# Patient Record
Sex: MALE | Race: WHITE | Employment: UNEMPLOYED | URBAN - METROPOLITAN AREA
[De-identification: names, ages, dates, MRNs, and addresses within clinical notes are randomized per-mention and may not be internally consistent; named-entity substitution may affect disease eponyms.]

---

## 2021-03-16 ENCOUNTER — HOSPITAL ENCOUNTER (EMERGENCY)
Age: 31
Discharge: HOME | End: 2021-03-17
Attending: STUDENT IN AN ORGANIZED HEALTH CARE EDUCATION/TRAINING PROGRAM
Payer: COMMERCIAL

## 2021-03-16 ENCOUNTER — APPOINTMENT (OUTPATIENT)
Dept: GENERAL RADIOLOGY | Age: 31
End: 2021-03-16
Payer: COMMERCIAL

## 2021-03-16 ENCOUNTER — HOSPITAL ENCOUNTER (EMERGENCY)
Age: 31
Discharge: HOME | End: 2021-03-16
Attending: EMERGENCY MEDICINE
Payer: COMMERCIAL

## 2021-03-16 ENCOUNTER — OFFICE VISIT (OUTPATIENT)
Dept: ORTHOPEDIC SURGERY | Age: 31
End: 2021-03-16
Payer: COMMERCIAL

## 2021-03-16 VITALS
TEMPERATURE: 97.9 F | DIASTOLIC BLOOD PRESSURE: 96 MMHG | OXYGEN SATURATION: 97 % | SYSTOLIC BLOOD PRESSURE: 154 MMHG | HEART RATE: 69 BPM | WEIGHT: 230 LBS | BODY MASS INDEX: 28.6 KG/M2 | RESPIRATION RATE: 16 BRPM | HEIGHT: 75 IN

## 2021-03-16 DIAGNOSIS — R22.31 LOCALIZED SWELLING ON RIGHT HAND: ICD-10-CM

## 2021-03-16 DIAGNOSIS — M79.89 SWELLING OF RIGHT HAND: Primary | ICD-10-CM

## 2021-03-16 DIAGNOSIS — Z76.0 ENCOUNTER FOR MEDICATION REFILL: ICD-10-CM

## 2021-03-16 DIAGNOSIS — M79.641 RIGHT HAND PAIN: Primary | ICD-10-CM

## 2021-03-16 DIAGNOSIS — L03.113 CELLULITIS OF RIGHT HAND: Primary | ICD-10-CM

## 2021-03-16 DIAGNOSIS — F19.11 HISTORY OF DRUG ABUSE (HCC): ICD-10-CM

## 2021-03-16 PROCEDURE — G8427 DOCREV CUR MEDS BY ELIG CLIN: HCPCS | Performed by: PHYSICIAN ASSISTANT

## 2021-03-16 PROCEDURE — 99284 EMERGENCY DEPT VISIT MOD MDM: CPT

## 2021-03-16 PROCEDURE — 96374 THER/PROPH/DIAG INJ IV PUSH: CPT

## 2021-03-16 PROCEDURE — 4004F PT TOBACCO SCREEN RCVD TLK: CPT | Performed by: PHYSICIAN ASSISTANT

## 2021-03-16 PROCEDURE — 99204 OFFICE O/P NEW MOD 45 MIN: CPT | Performed by: PHYSICIAN ASSISTANT

## 2021-03-16 PROCEDURE — G8484 FLU IMMUNIZE NO ADMIN: HCPCS | Performed by: PHYSICIAN ASSISTANT

## 2021-03-16 PROCEDURE — G8419 CALC BMI OUT NRM PARAM NOF/U: HCPCS | Performed by: PHYSICIAN ASSISTANT

## 2021-03-16 PROCEDURE — 73130 X-RAY EXAM OF HAND: CPT

## 2021-03-16 RX ORDER — LEVETIRACETAM 500 MG/1
750 TABLET ORAL 2 TIMES DAILY
Qty: 30 TABLET | Refills: 0 | Status: SHIPPED | OUTPATIENT
Start: 2021-03-16 | End: 2021-03-26

## 2021-03-16 RX ORDER — NAPROXEN 500 MG/1
500 TABLET ORAL 2 TIMES DAILY
Qty: 30 TABLET | Refills: 0 | Status: SHIPPED | OUTPATIENT
Start: 2021-03-16 | End: 2021-10-20

## 2021-03-16 RX ORDER — GABAPENTIN 800 MG/1
800 TABLET ORAL 4 TIMES DAILY
COMMUNITY

## 2021-03-16 RX ORDER — LEVETIRACETAM 500 MG/1
750 TABLET ORAL 2 TIMES DAILY
COMMUNITY
End: 2021-03-16 | Stop reason: SDUPTHER

## 2021-03-16 RX ORDER — METHADONE HYDROCHLORIDE 10 MG/1
130 TABLET ORAL DAILY
COMMUNITY

## 2021-03-16 ASSESSMENT — PAIN DESCRIPTION - LOCATION
LOCATION: HAND;ARM
LOCATION: HAND

## 2021-03-16 ASSESSMENT — PAIN SCALES - GENERAL: PAINLEVEL_OUTOF10: 10

## 2021-03-16 NOTE — ED NOTES
Placed pt's right hand in Velcro Thumb Spica. Pt tolerated application and acknowledged care taking instructions.      Ottoniel Parker  03/16/21 1131

## 2021-03-16 NOTE — ED NOTES
Verbal and written discharge instructions given. Prescriptions called in to patient's preferred pharmacy. Patient ambulatory at time of discharge. Patient in stable condition, discharged home.      Svetlana Quiñonez RN  03/16/21 8617

## 2021-03-16 NOTE — DISCHARGE INSTR - COC
Continuity of Care Form    Patient Name: Boone Castro   :  1990  MRN:  [id number]    Admit date:  3/16/2021  Discharge date:  ***    Code Status Order: No Order   Advance Directives:     Admitting Physician:  No admitting provider for patient encounter. PCP: No primary care provider on file. Discharging Nurse: [facility] Unit/Room#:   Discharging Unit Phone Number: ***    Emergency Contact:   Extended Emergency Contact Information  Primary Emergency Contact: Jose Castro  Address: [address], [place] [address]  Home Phone: [phone]  Relation: Parent  Secondary Emergency Contact: Rimma Simeon III  Address: [address], [place] [address]  Home Phone: [phone]  Relation: Parent    Past Surgical History:  History reviewed. No pertinent surgical history. Immunization History: There is no immunization history on file for this patient. Active Problems: There is no problem list on file for this patient.       Isolation/Infection:   Isolation          No Isolation        Patient Infection Status     None to display          Nurse Assessment:  Last Vital Signs: BP (!) 155/99   Pulse 79   Temp 97.9 °F (36.6 °C) (Oral)   Resp 16   Ht 6' 3\" (1.905 m)   Wt 230 lb (104.3 kg)   SpO2 96%   BMI 28.75 kg/m²     Last documented pain score (0-10 scale): Pain Level: 7  Last Weight:   Wt Readings from Last 1 Encounters:   21 230 lb (104.3 kg)     Mental Status:  {IP PT MENTAL STATUS:15927}    IV Access:  { CHANEL IV ACCESS:483862758}    Nursing Mobility/ADLs:  Walking   {CHP DME VYYF:143292794}  Transfer  {CHP DME LQHV:221533762}  Bathing  {CHP DME MBQP:636503094}  Dressing  {CHP DME PPPF:476774534}  Toileting  {CHP DME LMLF:234790230}  Feeding  {CHP DME DQVB:646556116}  Med Admin  {CHP DME VBUY:299117302}  Med Delivery   { CHANEL MED Delivery:606318043}    Wound Care Documentation and Therapy:        Elimination:  Continence: · Bowel: {YES / TL:37228}  · Bladder: {YES / LB:02512}  Urinary Catheter: {Urinary Catheter:278597904}   Colostomy/Ileostomy/Ileal Conduit: {YES / HD:55606}       Date of Last BM: ***  No intake or output data in the 24 hours ending 21 1108  No intake/output data recorded.     Safety Concerns:     [address] [facility] Safety Concerns:845072979}    Impairments/Disabilities:      [facility] Impairments/Disabilities:753616539}    Nutrition Therapy:  Current Nutrition Therapy:   [facility] Diet List:652827633}    Routes of Feeding: {CHP DME Other Feedings:916125140}  Liquids: {Slp liquid thickness:25319}  Daily Fluid Restriction: {CHP DME Yes amt example:866797573}  Last Modified Barium Swallow with Video (Video Swallowing Test): {Done Not Done RHLT:645070397}    Treatments at the Time of Hospital Discharge:   Respiratory Treatments: ***  Oxygen Therapy:  {Therapy; copd oxygen:08436}  Ventilator:    { CC Vent DBXP:972891236}    Rehab Therapies: {THERAPEUTIC INTERVENTION:5347615364}  Weight Bearing Status/Restrictions: [address] [place] [address] Weight Bearin}  Other Medical Equipment (for information only, NOT a DME order):  {EQUIPMENT:830924202}  Other Treatments: ***    Patient's personal belongings (please select all that are sent with patient):  {P DME Belongings:790370637}    RN SIGNATURE:  {Esignature:798916243}    CASE MANAGEMENT/SOCIAL WORK SECTION    Inpatient Status Date: ***    Readmission Risk Assessment Score:  Readmission Risk              Risk of Unplanned Readmission:        0           Discharging to Facility/ Agency   · Name:   · Address:  · Phone:  · Fax:    Dialysis Facility (if applicable)   · Name:  · Address:  · Dialysis Schedule:  · Phone:  · Fax:    / signature: {Esignature:180259854}    PHYSICIAN SECTION    Prognosis: {Prognosis:0878234001}    Condition at Discharge: [address] Patient Condition:207521221}    Rehab Potential (if transferring to Rehab): {Prognosis:0709721486}    Recommended Labs or Other Treatments After Discharge: ***    Physician Certification: I certify the above information and transfer of Nuria Castro  is necessary for the continuing treatment of the diagnosis listed and that he requires {Admit to Appropriate Level of Care:74388} for {GREATER/LESS:825769199} 30 days.      Update Admission H&P: {CHP DME Changes in XXN:219384609}    PHYSICIAN SIGNATURE:  {Esignature:439036405}

## 2021-03-16 NOTE — ED PROVIDER NOTES
[facility] Emergency Department    CHIEF COMPLAINT  Hand Problem (right hand swollen and hurting, states he \"smashed it\" at work yesterday between dry wall and a door jam, pain extends up into the back of his arm )      SHARED SERVICE VISIT  Evaluated by NICOLASA. My supervising physician was available for consultation. HISTORY OF PRESENT ILLNESS  Arti Nieto is a 32 y.o. male who presents to the ED complaining of a 1 day history of right hand pain. Patient drove himself in for evaluation. Patient states that he developed some right hand pain and swelling after smashing his hand at work yesterday. Reports pain worse with touch and movement. Does not radiate. Mild tingling in fingers. He is right hand dominant. denies fevers chills. According to chart review is on methadone although denies history of IV drug abuse. Also reports being new to the area. States he was on Keppra and gabapentin for history of seizure disorder. Currently does not have a primary care physician here and requesting referral as well as refill medications. No other complaints, modifying factors or associated symptoms. Nursing notes reviewed. Past Medical History:   Diagnosis Date    Seizures ([address]      History reviewed. No pertinent surgical history. History reviewed. No pertinent family history.   Social History     Socioeconomic History    Marital status: Single     Spouse name: Not on file    Number of children: Not on file    Years of education: Not on file    Highest education level: Not on file   Occupational History    Not on file   Social Needs    Financial resource strain: Not on file    Food insecurity     Worry: Not on file     Inability: Not on file    Transportation needs     Medical: Not on file     Non-medical: Not on file   Tobacco Use    Smoking status: Current Every Day Smoker     Packs/day: 1.00    Smokeless tobacco: Never Used   Substance and Sexual Activity  Alcohol use: No    Drug use: Not Currently     Comment: hx heroin    Sexual activity: Yes     Partners: Female   Lifestyle    Physical activity     Days per week: Not on file     Minutes per session: Not on file    Stress: Not on file   Relationships    Social connections     Talks on phone: Not on file     Gets together: Not on file     Attends Church service: Not on file     Active member of club or organization: Not on file     Attends meetings of clubs or organizations: Not on file     Relationship status: Not on file    Intimate partner violence     Fear of current or ex partner: Not on file     Emotionally abused: Not on file     Physically abused: Not on file     Forced sexual activity: Not on file   Other Topics Concern    Not on file   Social History Narrative    Not on file     No current facility-administered medications for this encounter. Current Outpatient Medications   Medication Sig Dispense Refill    methadone (DOLOPHINE) 10 MG tablet Take 130 mg by mouth daily.  levETIRAcetam (KEPPRA) 500 MG tablet Take 1.5 tablets by mouth 2 times daily for 10 days 30 tablet 0    naproxen (NAPROSYN) 500 MG tablet Take 1 tablet by mouth 2 times daily 30 tablet 0    albuterol sulfate HFA (PROVENTIL HFA) 108 (90 BASE) MCG/ACT inhaler Inhale 2 puffs into the lungs every 4 hours as needed for Wheezing 1 Inhaler 0    gabapentin (NEURONTIN) 800 MG tablet Take 800 mg by mouth 4 times daily. No Known Allergies    REVIEW OF SYSTEMS  10 systems reviewed, pertinent positives per HPI otherwise noted to be negative    PHYSICAL EXAM  BP (!) 155/99   Pulse 79   Temp 97.9 °F (36.6 °C) (Oral)   Resp 16   Ht 6' 3\" (1.905 m)   Wt 230 lb (104.3 kg)   SpO2 96%   BMI 28.75 kg/m²   GENERAL APPEARANCE: Awake and alert. Cooperative. No acute distress. HEAD: Normocephalic. Atraumatic. EYES: PERRL. EOM's grossly intact. ENT: Mucous membranes are moist.   NECK: Supple. HEART: RRR.  No murmurs. LUNGS: Respirations unlabored. CTAB. Good air exchange. Speaking comfortably in full sentences. ABDOMEN: Soft. Non-distended. Non-tender. No guarding or rebound. No masses. No organomegaly. EXTREMITIES: No peripheral edema. Moves all extremities equally. All extremities neurovascularly intact. No obvious puncture wounds to right upper extremity suggest current IV drug abuse. The right hand is swollen without erythema. Pain along ulnar aspect of hand and wrist.  Mild snuffbox tenderness. No obvious deformities or step-offs and compartment soft without crepitus. No evidence of dislocation subluxation. Normal range of motion and strength testing although does elicit pain. Radial pulses +2 and cap refill less than 5 seconds. SKIN: Warm and dry. No acute rashes. NEUROLOGICAL: Alert and oriented. CN's 2-12 intact. No gross facial drooping. Strength 5/5, sensation intact. PSYCHIATRIC: Normal mood and affect. RADIOLOGY  Xr Hand Right (min 3 Views)    Result Date: 3/16/2021  No evidence of acute fracture. Follow-up examination recommended in 7-10 days if clinically indicated. ED COURSE  Pain control was not required while here in the emergency department. Triage vitals stable. Right hand plain films without evidence to suggest acute injury. Given snuffbox tenderness and swelling placed in thumb spica OCL splint. Placed by nursing staff and patient neurovascular intact status post application. Discharged with recommendations for ice and elevation as well as close orthopedic follow-up for repeat imaging should it be indicated. We have also discussed return precautions and recommendations for follow-up. He was provided with PCP referral.  Will refill Keppra. A discussion was had with Mr. Checo Robins regarding hand pain, ED findings and recommendations for follow-up. Risk management discussed and shared decision making had with patient and/or surrogate. All questions were answered.

## 2021-03-16 NOTE — PROGRESS NOTES
Name Primary?  Right hand pain Yes    Localized swelling on right hand     History of drug abuse ([address]        Office Procedures:  Orders Placed This Encounter   Procedures    XR HAND RIGHT (MIN 3 VIEWS)     Standing Status:   Future     Number of Occurrences:   1     Standing Expiration Date:   4/16/2021    SEDIMENTATION RATE     Standing Status:   Future     Standing Expiration Date:   3/16/2022    C-REACTIVE PROTEIN     Standing Status:   Future     Standing Expiration Date:   3/16/2022    CBC WITH AUTO DIFFERENTIAL     Standing Status:   Future     Standing Expiration Date:   3/16/2022       Treatment Plan: After reviewing the patient's emergency room note again there are too many contraindications between the 2 stories. First in the ER he stated he had significant trauma and it was significantly downplayed in today's visit. In addition upon first questioning him he denied any previous treatment of the hand. Also in his chart it does state that he has a history of IV drug use but he denied this history in the emergency room. I will send the patient to the hospital for stat acute phase reactants to look for signs of infection. I would like him to see Dr. Olive Castaneda tomorrow to ensure that the patient does not have a developing cellulitis.

## 2021-03-17 ENCOUNTER — APPOINTMENT (OUTPATIENT)
Dept: CT IMAGING | Age: 31
End: 2021-03-17
Payer: COMMERCIAL

## 2021-03-17 ENCOUNTER — OFFICE VISIT (OUTPATIENT)
Dept: ORTHOPEDIC SURGERY | Age: 31
End: 2021-03-17
Payer: COMMERCIAL

## 2021-03-17 VITALS
DIASTOLIC BLOOD PRESSURE: 83 MMHG | WEIGHT: 315 LBS | HEIGHT: 76 IN | HEART RATE: 76 BPM | BODY MASS INDEX: 38.36 KG/M2 | OXYGEN SATURATION: 100 % | RESPIRATION RATE: 18 BRPM | SYSTOLIC BLOOD PRESSURE: 151 MMHG | TEMPERATURE: 97.2 F

## 2021-03-17 VITALS — WEIGHT: 315 LBS | BODY MASS INDEX: 38.36 KG/M2 | HEIGHT: 76 IN

## 2021-03-17 DIAGNOSIS — L03.113 CELLULITIS OF RIGHT HAND: Primary | ICD-10-CM

## 2021-03-17 LAB
A/G RATIO: 1.5 (ref 1.1–2.2)
ALBUMIN SERPL-MCNC: 4.7 G/DL (ref 3.4–5)
ALP BLD-CCNC: 101 U/L (ref 40–129)
ALT SERPL-CCNC: 45 U/L (ref 10–40)
ANION GAP SERPL CALCULATED.3IONS-SCNC: 10 MMOL/L (ref 3–16)
AST SERPL-CCNC: 30 U/L (ref 15–37)
BASOPHILS ABSOLUTE: 0.1 K/UL (ref 0–0.2)
BASOPHILS RELATIVE PERCENT: 1.6 %
BILIRUB SERPL-MCNC: <0.2 MG/DL (ref 0–1)
BUN BLDV-MCNC: 13 MG/DL (ref 7–20)
C-REACTIVE PROTEIN: 8 MG/L (ref 0–5.1)
CALCIUM SERPL-MCNC: 9.7 MG/DL (ref 8.3–10.6)
CHLORIDE BLD-SCNC: 100 MMOL/L (ref 99–110)
CO2: 30 MMOL/L (ref 21–32)
CREAT SERPL-MCNC: 0.8 MG/DL (ref 0.9–1.3)
EOSINOPHILS ABSOLUTE: 0.4 K/UL (ref 0–0.6)
EOSINOPHILS RELATIVE PERCENT: 5.2 %
GFR AFRICAN AMERICAN: >60
GFR NON-AFRICAN AMERICAN: >60
GLOBULIN: 3.2 G/DL
GLUCOSE BLD-MCNC: 84 MG/DL (ref 70–99)
HCT VFR BLD CALC: 39.6 % (ref 40.5–52.5)
HEMOGLOBIN: 13.3 G/DL (ref 13.5–17.5)
LYMPHOCYTES ABSOLUTE: 2.2 K/UL (ref 1–5.1)
LYMPHOCYTES RELATIVE PERCENT: 29.3 %
MCH RBC QN AUTO: 30 PG (ref 26–34)
MCHC RBC AUTO-ENTMCNC: 33.6 G/DL (ref 31–36)
MCV RBC AUTO: 89.1 FL (ref 80–100)
MONOCYTES ABSOLUTE: 0.5 K/UL (ref 0–1.3)
MONOCYTES RELATIVE PERCENT: 6.8 %
NEUTROPHILS ABSOLUTE: 4.2 K/UL (ref 1.7–7.7)
NEUTROPHILS RELATIVE PERCENT: 57.1 %
PDW BLD-RTO: 15.4 % (ref 12.4–15.4)
PLATELET # BLD: 203 K/UL (ref 135–450)
PMV BLD AUTO: 7 FL (ref 5–10.5)
POTASSIUM REFLEX MAGNESIUM: 4 MMOL/L (ref 3.5–5.1)
RBC # BLD: 4.45 M/UL (ref 4.2–5.9)
SEDIMENTATION RATE, ERYTHROCYTE: 8 MM/HR (ref 0–15)
SODIUM BLD-SCNC: 140 MMOL/L (ref 136–145)
TOTAL PROTEIN: 7.9 G/DL (ref 6.4–8.2)
WBC # BLD: 7.4 K/UL (ref 4–11)

## 2021-03-17 PROCEDURE — 6360000004 HC RX CONTRAST MEDICATION: Performed by: STUDENT IN AN ORGANIZED HEALTH CARE EDUCATION/TRAINING PROGRAM

## 2021-03-17 PROCEDURE — 86140 C-REACTIVE PROTEIN: CPT

## 2021-03-17 PROCEDURE — 85652 RBC SED RATE AUTOMATED: CPT

## 2021-03-17 PROCEDURE — 6360000002 HC RX W HCPCS: Performed by: STUDENT IN AN ORGANIZED HEALTH CARE EDUCATION/TRAINING PROGRAM

## 2021-03-17 PROCEDURE — 73201 CT UPPER EXTREMITY W/DYE: CPT

## 2021-03-17 PROCEDURE — 80053 COMPREHEN METABOLIC PANEL: CPT

## 2021-03-17 PROCEDURE — 99243 OFF/OP CNSLTJ NEW/EST LOW 30: CPT | Performed by: ORTHOPAEDIC SURGERY

## 2021-03-17 PROCEDURE — 6370000000 HC RX 637 (ALT 250 FOR IP): Performed by: STUDENT IN AN ORGANIZED HEALTH CARE EDUCATION/TRAINING PROGRAM

## 2021-03-17 PROCEDURE — 85025 COMPLETE CBC W/AUTO DIFF WBC: CPT

## 2021-03-17 RX ORDER — CEPHALEXIN 500 MG/1
500 CAPSULE ORAL 4 TIMES DAILY
Qty: 28 CAPSULE | Refills: 0 | Status: SHIPPED | OUTPATIENT
Start: 2021-03-17 | End: 2021-03-24

## 2021-03-17 RX ORDER — KETOROLAC TROMETHAMINE 30 MG/ML
15 INJECTION, SOLUTION INTRAMUSCULAR; INTRAVENOUS ONCE
Status: COMPLETED | OUTPATIENT
Start: 2021-03-17 | End: 2021-03-17

## 2021-03-17 RX ORDER — CEPHALEXIN 500 MG/1
500 CAPSULE ORAL ONCE
Status: COMPLETED | OUTPATIENT
Start: 2021-03-17 | End: 2021-03-17

## 2021-03-17 RX ORDER — ACETAMINOPHEN 500 MG
1000 TABLET ORAL ONCE
Status: COMPLETED | OUTPATIENT
Start: 2021-03-17 | End: 2021-03-17

## 2021-03-17 RX ADMIN — ACETAMINOPHEN 1000 MG: 500 TABLET ORAL at 02:18

## 2021-03-17 RX ADMIN — CEPHALEXIN 500 MG: 500 CAPSULE ORAL at 02:18

## 2021-03-17 RX ADMIN — KETOROLAC TROMETHAMINE 15 MG: 30 INJECTION, SOLUTION INTRAMUSCULAR; INTRAVENOUS at 02:18

## 2021-03-17 RX ADMIN — IOPAMIDOL 75 ML: 755 INJECTION, SOLUTION INTRAVENOUS at 00:52

## 2021-03-17 ASSESSMENT — PAIN SCALES - GENERAL: PAINLEVEL_OUTOF10: 10

## 2021-03-17 NOTE — ED TRIAGE NOTES
Chief Complaint   Patient presents with    Hand Pain     Pt states that he started with swollen right hand two days ago. Denies injury.

## 2021-03-17 NOTE — ED PROVIDER NOTES
[address] ED      CHIEF COMPLAINT  Hand Pain (Pt states that he started with swollen right hand two days ago. Denies injury. )     HISTORY OF PRESENT ILLNESS  Alejandro Moreno is a 32 y.o. male  who presents to the ED complaining of right hand pain and swelling for the past 2 days. Patient denies any known injury, however per chart review he states that he had injured at work few days ago. He was actually seen at [facility] earlier in the day, where he had x-ray of the right hand performed which did not reveal any abnormalities. He was advised to follow-up with orthopedics, saw session assistant Ekta Sheth in the [facility] after-hours orthopedic clinic. He does have a history of IV drug abuse in the past but denies anything recently. He was advised to go to the ER by orthopedics to obtain inflammatory markers, has a follow-up appointment with Dr. Jennifer Liz in orthopedics tomorrow. Denies any fevers or chills. He denies any other complaints or concerns. No other complaints, modifying factors or associated symptoms. I have reviewed the following from the nursing documentation. Past Medical History:   Diagnosis Date    Seizures ([facility] [address]      History reviewed. No pertinent surgical history. History reviewed. No pertinent family history.   Social History     Socioeconomic History    Marital status: Single     Spouse name: Not on file    Number of children: Not on file    Years of education: Not on file    Highest education level: Not on file   Occupational History    Not on file   Social Needs    Financial resource strain: Not on file    Food insecurity     Worry: Not on file     Inability: Not on file    Transportation needs     Medical: Not on file     Non-medical: Not on file   Tobacco Use    Smoking status: Current Every Day Smoker     Packs/day: 1.00    Smokeless tobacco: Never Used   Substance and Sexual Activity    Alcohol use: No    Drug use: Not Currently     Comment: hx heroin    Sexual activity: Yes     Partners: Female   Lifestyle    Physical activity     Days per week: Not on file     Minutes per session: Not on file    Stress: Not on file   Relationships    Social connections     Talks on phone: Not on file     Gets together: Not on file     Attends Confucianist service: Not on file     Active member of club or organization: Not on file     Attends meetings of clubs or organizations: Not on file     Relationship status: Not on file    Intimate partner violence     Fear of current or ex partner: Not on file     Emotionally abused: Not on file     Physically abused: Not on file     Forced sexual activity: Not on file   Other Topics Concern    Not on file   Social History Narrative    Not on file     No current facility-administered medications for this encounter. Current Outpatient Medications   Medication Sig Dispense Refill    cephALEXin (KEFLEX) 500 MG capsule Take 1 capsule by mouth 4 times daily for 7 days 28 capsule 0    methadone (DOLOPHINE) 10 MG tablet Take 130 mg by mouth daily.  gabapentin (NEURONTIN) 800 MG tablet Take 800 mg by mouth 4 times daily.  levETIRAcetam (KEPPRA) 500 MG tablet Take 1.5 tablets by mouth 2 times daily for 10 days 30 tablet 0    naproxen (NAPROSYN) 500 MG tablet Take 1 tablet by mouth 2 times daily 30 tablet 0    albuterol sulfate HFA (PROVENTIL HFA) 108 (90 BASE) MCG/ACT inhaler Inhale 2 puffs into the lungs every 4 hours as needed for Wheezing 1 Inhaler 0     No Known Allergies    REVIEW OF SYSTEMS  10 systems reviewed, pertinent positives per HPI otherwise noted to be negative. PHYSICAL EXAM  BP (!) 151/83   Pulse 76   Temp 97.2 °F (36.2 °C) (Oral)   Resp 18   Ht 6' 4\" (1.93 m)   Wt (!) 335 lb (152 kg)   SpO2 100%   BMI 40.78 kg/m²    GENERAL APPEARANCE: Awake and alert. Cooperative. No acute distress. HENT: Normocephalic. Atraumatic. NECK: Supple. EYES: PERRL. EOM's grossly intact. HEART/CHEST: RRR. No murmurs. LUNGS: Respirations unlabored. CTAB. Good air exchange. Speaking comfortably in full sentences. ABDOMEN: No tenderness. Soft. Non-distended. No masses. No organomegaly. No guarding or rebound. MUSCULOSKELETAL: Tenderness palpation over the dorsal and volar aspects of the right hand, it is neurovascularly intact with soft compartments. There is pain primarily over the thenar eminence, no crepitus or obvious deformities. He has no specific tenderness palpation over the flexor tendons. He is able to form a fist and open his hand completely however it is painful to do so. No deformity. .  All extremities neurovascularly intact. SKIN: Warm and dry. No acute rashes. NEUROLOGICAL: Alert and oriented. CN's 2-12 intact. No gross facial drooping. Strength 5/5, sensation intact. Gait normal.  PSYCHIATRIC: Normal mood and affect. LABS  I have reviewed all labs for this visit.    Results for orders placed or performed during the hospital encounter of 03/16/21   CBC Auto Differential   Result Value Ref Range    WBC 7.4 4.0 - 11.0 K/uL    RBC 4.45 4.20 - 5.90 M/uL    Hemoglobin 13.3 (L) 13.5 - 17.5 g/dL    Hematocrit 39.6 (L) 40.5 - 52.5 %    MCV 89.1 80.0 - 100.0 fL    MCH 30.0 26.0 - 34.0 pg    MCHC 33.6 31.0 - 36.0 g/dL    RDW 15.4 12.4 - 15.4 %    Platelets 295 090 - 773 K/uL    MPV 7.0 5.0 - 10.5 fL    Neutrophils % 57.1 %    Lymphocytes % 29.3 %    Monocytes % 6.8 %    Eosinophils % 5.2 %    Basophils % 1.6 %    Neutrophils Absolute 4.2 1.7 - 7.7 K/uL    Lymphocytes Absolute 2.2 1.0 - 5.1 K/uL    Monocytes Absolute 0.5 0.0 - 1.3 K/uL    Eosinophils Absolute 0.4 0.0 - 0.6 K/uL    Basophils Absolute 0.1 0.0 - 0.2 K/uL   Comprehensive Metabolic Panel w/ Reflex to MG   Result Value Ref Range    Sodium 140 136 - 145 mmol/L    Potassium reflex Magnesium 4.0 3.5 - 5.1 mmol/L    Chloride 100 99 - 110 mmol/L    CO2 30 21 - 32 mmol/L    Anion Gap 10 3 - 16    Glucose 84 70 - 99 mg/dL    BUN 13 7 - 20 mg/dL CREATININE 0.8 (L) 0.9 - 1.3 mg/dL    GFR Non-African American >60 >60    GFR African American >60 >60    Calcium 9.7 8.3 - 10.6 mg/dL    Total Protein 7.9 6.4 - 8.2 g/dL    Albumin 4.7 3.4 - 5.0 g/dL    Albumin/Globulin Ratio 1.5 1.1 - 2.2    Total Bilirubin <0.2 0.0 - 1.0 mg/dL    Alkaline Phosphatase 101 40 - 129 U/L    ALT 45 (H) 10 - 40 U/L    AST 30 15 - 37 U/L    Globulin 3.2 g/dL   Sedimentation Rate   Result Value Ref Range    Sed Rate 8 0 - 15 mm/Hr     RADIOLOGY  CT HAND RIGHT W CONTRAST   Final Result   Soft tissue swelling without evidence for abscess or osteomyelitis. ED COURSE/MDM  Patient seen and evaluated. Old records reviewed. Labs and imaging reviewed and results discussed with patient. Patient is a 40-year-old male, sent by orthopedic clinic with concerns for right hand pain and swelling for the past 2 days. Full HPI as detailed above. Upon arrival in the ED, vitals reassuring. Physical exam as detailed above, patient has diffuse swelling of the right hand, no specific areas of erythema no lacerations. He has tenderness palpation over the dorsal aspect of the hand as well as the palmar aspect, it is most prominent over the thenar eminence. Range of motion of the hand is intact. He is afebrile and appears nontoxic. However, given the degree of swelling, labs were obtained as well as CT of the right hand which showed soft tissue swelling without evidence for abscess or osteomyelitis. He has no specific tenderness palpation over the flexor tendons and I am not concerned at this time for flexor tenosynovitis. Labs revealed no leukocytosis. No acute concerning electrolyte abnormalities. Sed rate was normal at 8. CRP was sent as well but is pending. Patient has a follow-up appoint with Dr. Art Snow tomorrow. He will be started on antibiotics for presumed cellulitis at this time.   He was given return precautions for the ED and was advised to follow-up with his PCP as needed. I stressed the need for follow-up with Dr. Vikash Diop tomorrow for further evaluation. Patient was comfortable in agreement with plan of care and was discharged. During the patient's ED course, the patient was given:  Medications   iopamidol (ISOVUE-370) 76 % injection 75 mL (75 mLs Intravenous Given 3/17/21 0052)   ketorolac (TORADOL) injection 15 mg (15 mg Intravenous Given 3/17/21 0218)   acetaminophen (TYLENOL) tablet 1,000 mg (1,000 mg Oral Given 3/17/21 0218)   cephALEXin (KEFLEX) capsule 500 mg (500 mg Oral Given 3/17/21 0218)        CLINICAL IMPRESSION  1. Cellulitis of right hand        Blood pressure (!) 151/83, pulse 76, temperature 97.2 °F (36.2 °C), temperature source Oral, resp. rate 18, height 6' 4\" (1.93 m), weight (!) 335 lb (152 kg), SpO2 100 %. DISPOSITION  Cesar Castro was discharged to home in good condition. Patient was given scripts for the following medications. I counseled patient how to take these medications. Discharge Medication List as of 3/17/2021  2:12 AM      START taking these medications    Details   cephALEXin (KEFLEX) 500 MG capsule Take 1 capsule by mouth 4 times daily for 7 days, Disp-28 capsule, R-0Normal             [facility]:  [facility] [place] [facility]  [address] [place] [address]  [phone]  Go to   If symptoms worsen    Jerri Moore MD  [address]    Go on 3/17/2021        DISCLAIMER: This chart was created using Dragon dictation software. Efforts were made by me to ensure accuracy, however some errors may be present due to limitations of this technology and occasionally words are not transcribed correctly.        Carmela Plaza MD  03/17/21 1955

## 2021-03-18 NOTE — PROGRESS NOTES
[facility]  Outpatient Progress Note  Rosy Byers MD    Patient Name: Milena Castro MRN: [id number]   Age: 32 y.o. YOB: 1990   Sex: male      [address] Complaint   Patient presents with    Pain     Right Hand er 02/16/2021  ahc 02/16/2021  back to er 02/16/2021 smashed at work . .. HISTORY OF PRESENT ILLNESS   Avril Brannon is a 32 y.o. male presents the office today for evaluation of right hand pain and swelling. Patient states that he is here with a chief complaint of right hand pain. He has had right hand pain for the last several days. Upon his history today he does admit to a fairly mild trauma at work. He said that the hand was punctured with a finishing nail on the volar aspect of the hand at the thenar eminence. He was seen in the emergency department yesterday morning and then he saw Edwin James in after-hours clinic yesterday evening. There was some question about the consistency and reliability of the patient's history. He was placed on oral antibiotics  and some blood work was ordered. He went back to the emergency department after seeing the PA in after-hours clinic where the lab work was completed and a CT scan of the hand was ordered. There is no evidence for concern of deep space infection or abscess and his white blood cell count was normal and sed rate was normal.  He was discharged from the emergency department and instructed to follow-up with me today. His pain is diffuse over the palmar and extensor surfaces of the hand. He denies any fever or chills. He reports that the pain and swelling in his hand are markedly improved after just 1 day of antibiotics    PAST MEDICAL HISTORY      Past Medical History:   Diagnosis Date    Seizures ([address]        PAST SURGICAL HISTORY   No past surgical history on file.     MEDICATIONS     Current Outpatient Medications   Medication Sig Dispense Refill    cephALEXin (Myrla Roots) 500 MG capsule Take 1 capsule by mouth 4 times daily for 7 days 28 capsule 0    methadone (DOLOPHINE) 10 MG tablet Take 130 mg by mouth daily.  gabapentin (NEURONTIN) 800 MG tablet Take 800 mg by mouth 4 times daily.  levETIRAcetam (KEPPRA) 500 MG tablet Take 1.5 tablets by mouth 2 times daily for 10 days 30 tablet 0    naproxen (NAPROSYN) 500 MG tablet Take 1 tablet by mouth 2 times daily 30 tablet 0    albuterol sulfate HFA (PROVENTIL HFA) 108 (90 BASE) MCG/ACT inhaler Inhale 2 puffs into the lungs every 4 hours as needed for Wheezing 1 Inhaler 0     No current facility-administered medications for this visit. ALLERGIES   No Known Allergies    FAMILY HISTORY   No family history on file.     SOCIAL HISTORY     Social History     Socioeconomic History    Marital status: Single     Spouse name: Not on file    Number of children: Not on file    Years of education: Not on file    Highest education level: Not on file   Occupational History    Not on file   Social Needs    Financial resource strain: Not on file    Food insecurity     Worry: Not on file     Inability: Not on file    Transportation needs     Medical: Not on file     Non-medical: Not on file   Tobacco Use    Smoking status: Current Every Day Smoker     Packs/day: 1.00    Smokeless tobacco: Never Used   Substance and Sexual Activity    Alcohol use: No    Drug use: Not Currently     Comment: hx heroin    Sexual activity: Yes     Partners: Female   Lifestyle    Physical activity     Days per week: Not on file     Minutes per session: Not on file    Stress: Not on file   Relationships    Social connections     Talks on phone: Not on file     Gets together: Not on file     Attends Confucianist service: Not on file     Active member of club or organization: Not on file     Attends meetings of clubs or organizations: Not on file     Relationship status: Not on file    Intimate partner violence     Fear of current or ex partner: Not on file     Emotionally abused: Not on file     Physically abused: Not on file     Forced sexual activity: Not on file   Other Topics Concern    Not on file   Social History Narrative    Not on file       REVIEW OF SYSTEMS   General: no fever, chills, night sweats, anorexia, malaise, fatigue, or weight change  Hematologic:  no unexplained bleeding or bruising  HEENT:   no nasal congestion, rhinorrhea, sore throat, or facial pain  Respiratory:  no cough, dyspnea, or chest pain  Cardiovascular:  no angina, COLLIER, PND, orthopnea, dependent edema, or palpitations  Gastrointestinal:  no nausea, vomiting, diarrhea, constipation, or abdominal pain  Genitourinary:  no urinary urgency, frequency, dysuria, or hematuria  Musculoskeletal: see HPI  Endocrine:  no heat or cold intolerance and no polyphagia, polydipsia, or polyuria  Skin:  no skin eruptions or changing lesions  Neurologic:  no focal weakness, numbness/tingling, tremor, or severe headache. See HPI. See HPI for pertinent positives. PHYSICAL EXAM   Vital Signs:   Vitals:    03/17/21 1559   Weight: (!) 330 lb (149.7 kg)   Height: 6' 4\" (1.93 m)       General appearance: healthy, alert, no distress  Skin: Skin color, texture, turgor normal. No rashes or lesions  HEENT: atraumatic, normocephalic. PERRL  Respiratory: Unlabored breathing  Lymphatic: No adenopathy   Neuro: Alert and oriented, normal distal sensation, normal bilateral DTRs  Vascular: Normal distal capillary and distal pulses  Muskuloskeletal Exam: Right hand examination does demonstrate diffuse soft tissue swelling on the volar and dorsal aspects of the hand with just mild erythema noted in the volar aspect of the wrist.  There is mild nonspecific tenderness to palpation in the volar aspect of the hand, dorsum of the hand and throughout the fingers but there are no palpable areas of fluid collection, fluctuance or induration. He is neurovascularly intact.   There is no significant pain with flexion or

## 2021-08-04 ENCOUNTER — HOSPITAL ENCOUNTER (EMERGENCY)
Age: 31
Discharge: HOME | End: 2021-08-04
Attending: EMERGENCY MEDICINE
Payer: COMMERCIAL

## 2021-08-04 VITALS
HEART RATE: 107 BPM | DIASTOLIC BLOOD PRESSURE: 92 MMHG | HEIGHT: 76 IN | TEMPERATURE: 98.1 F | WEIGHT: 280 LBS | BODY MASS INDEX: 34.1 KG/M2 | OXYGEN SATURATION: 95 % | SYSTOLIC BLOOD PRESSURE: 154 MMHG | RESPIRATION RATE: 18 BRPM

## 2021-08-04 DIAGNOSIS — L03.115 CELLULITIS OF RIGHT LOWER EXTREMITY: Primary | ICD-10-CM

## 2021-08-04 PROCEDURE — 99284 EMERGENCY DEPT VISIT MOD MDM: CPT

## 2021-08-04 PROCEDURE — 6370000000 HC RX 637 (ALT 250 FOR IP): Performed by: EMERGENCY MEDICINE

## 2021-08-04 RX ORDER — CLINDAMYCIN HYDROCHLORIDE 300 MG/1
300 CAPSULE ORAL 4 TIMES DAILY
Qty: 40 CAPSULE | Refills: 0 | Status: SHIPPED | OUTPATIENT
Start: 2021-08-04 | End: 2021-08-14

## 2021-08-04 RX ORDER — CLINDAMYCIN HYDROCHLORIDE 150 MG/1
300 CAPSULE ORAL ONCE
Status: COMPLETED | OUTPATIENT
Start: 2021-08-04 | End: 2021-08-04

## 2021-08-04 RX ORDER — NAPROXEN 500 MG/1
500 TABLET ORAL ONCE
Status: COMPLETED | OUTPATIENT
Start: 2021-08-04 | End: 2021-08-04

## 2021-08-04 RX ORDER — NAPROXEN 250 MG/1
250 TABLET ORAL 2 TIMES DAILY PRN
Qty: 20 TABLET | Refills: 0 | Status: SHIPPED | OUTPATIENT
Start: 2021-08-04 | End: 2021-10-20

## 2021-08-04 RX ADMIN — CLINDAMYCIN HYDROCHLORIDE 300 MG: 150 CAPSULE ORAL at 23:04

## 2021-08-04 RX ADMIN — NAPROXEN 500 MG: 500 TABLET ORAL at 23:04

## 2021-08-04 ASSESSMENT — PAIN DESCRIPTION - DESCRIPTORS: DESCRIPTORS: SHARP;THROBBING

## 2021-08-04 ASSESSMENT — PAIN DESCRIPTION - PAIN TYPE: TYPE: ACUTE PAIN

## 2021-08-04 ASSESSMENT — PAIN DESCRIPTION - ONSET: ONSET: GRADUAL

## 2021-08-04 ASSESSMENT — PAIN SCALES - GENERAL
PAINLEVEL_OUTOF10: 8
PAINLEVEL_OUTOF10: 7
PAINLEVEL_OUTOF10: 7

## 2021-08-04 ASSESSMENT — PAIN DESCRIPTION - FREQUENCY: FREQUENCY: CONTINUOUS

## 2021-08-04 ASSESSMENT — PAIN DESCRIPTION - LOCATION: LOCATION: FOOT

## 2021-08-05 NOTE — ED NOTES
Scabs and redness between great and first toe on bilateral feet worse on right than left. Swelling noted to right foot with light streaking to right lower inner calf.  PT states he has had a fever intermittently     Rachel Pang RN  08/04/21 3946

## 2021-08-05 NOTE — ED NOTES
2307  Both feet soaked in sterile normal saline/ chlorhexadine bath, with wounds cleaned with sterile 4x4 gauze. Area patted dry, with Poly Bactrin ointment applied to the wounds. Sterile 2x2 gauze pads placed between the big and first toe, and bandaged with 2 inch roller gauze on both feet.      William Flores  08/04/21 0530

## 2021-08-05 NOTE — ED PROVIDER NOTES
Physical Activity:     Days of Exercise per Week:     Minutes of Exercise per Session:    Stress:     Feeling of Stress :    Social Connections:     Frequency of Communication with Friends and Family:     Frequency of Social Gatherings with Friends and Family:     Attends Alevism Services:     Active Member of Clubs or Organizations:     Attends Club or Organization Meetings:     Marital Status:    Intimate Partner Violence:     Fear of Current or Ex-Partner:     Emotionally Abused:     Physically Abused:     Sexually Abused:      No current facility-administered medications for this encounter. Current Outpatient Medications   Medication Sig Dispense Refill    clindamycin (CLEOCIN) 300 MG capsule Take 1 capsule by mouth 4 times daily for 10 days 40 capsule 0    naproxen (NAPROSYN) 250 MG tablet Take 1 tablet by mouth 2 times daily as needed for Pain Take with food 20 tablet 0    methadone (DOLOPHINE) 10 MG tablet Take 130 mg by mouth daily.  gabapentin (NEURONTIN) 800 MG tablet Take 800 mg by mouth 4 times daily.  levETIRAcetam (KEPPRA) 500 MG tablet Take 1.5 tablets by mouth 2 times daily for 10 days 30 tablet 0    naproxen (NAPROSYN) 500 MG tablet Take 1 tablet by mouth 2 times daily 30 tablet 0    albuterol sulfate HFA (PROVENTIL HFA) 108 (90 BASE) MCG/ACT inhaler Inhale 2 puffs into the lungs every 4 hours as needed for Wheezing 1 Inhaler 0     Allergies   Allergen Reactions    Bactrim [Sulfamethoxazole-Trimethoprim]        REVIEW OF SYSTEMS  10 systems reviewed, pertinent positives per HPI otherwise noted to be negative. PHYSICAL EXAM  BP (!) 154/92   Pulse 107   Temp 98.1 °F (36.7 °C) (Oral)   Resp 18   Ht 6' 4\" (1.93 m)   Wt 280 lb (127 kg)   SpO2 95%   BMI 34.08 kg/m²    GENERAL APPEARANCE: Awake and alert. Cooperative. No acute distress. HENT: Normocephalic. Atraumatic. Mucous membranes are moist.  No drooling or stridor. NECK: Supple.     EYES: PERRL. EOM's grossly intact. HEART/CHEST: RRR. No murmurs. 2+ DP pulses b/l. LUNGS: Respirations unlabored. CTAB. Good air exchange. Speaking comfortably in full sentences. ABDOMEN: No tenderness. Soft. Non-distended. No masses. No organomegaly. No guarding or rebound. MUSCULOSKELETAL: No extremity edema. Compartments soft. No deformity. No tenderness in the extremities. All extremities neurovascularly intact. SKIN: Warm and dry. Patient with scabbed wounds noted in the space between the first and second toes on the left foot with slight surrounding erythema but on the right foot the great toe has significant breakdown of the skin and appears to be actually more ulcerated with surrounding erythema and soft tissue swelling with warmth and tenderness to the right great toe as well as some to the right second toe, with erythema extending slightly into the foot and then also an area of erythema to the medial right lower leg without any noted calf tenderness or circumferential involvement. No crepitus. No area of necrosis. No drainage or fluctuance. NEUROLOGICAL: Alert and oriented. CN's 2-12 intact. No gross facial drooping. Strength 5/5, sensation intact. No gross focal deficits. PSYCHIATRIC: Normal mood and affect. LABS  I have reviewed all labs for this visit. No results found for this visit on 08/04/21. RADIOLOGY  None    ED COURSE/MDM  Patient seen and evaluated. Old records reviewed. Patient with area of cellulitis extending from wounds bilaterally in the area between the great toes and second toes of the bilateral feet, right greater than left. He is overall well-appearing without any history diabetes and no clinical evidence of necrotizing fasciitis or more significant infection beyond the open wounds and cellulitis. Will treat with clindamycin orally as well as general wound care including topical antibiotic ointment and dry dressings.   Patient verbalized understanding and agreement of plan and will be discharged home with close outpatient follow-up. He was given referral to establish care with a PCP and strict return precautions were also discussed. All questions answered at time of discharge. I estimate there is LOW risk for COMPARTMENT SYNDROME, DEEP VENOUS THROMBOSIS, SEPTIC ARTHRITIS, TENDON OR NEUROVASCULAR INJURY, thus I consider the discharge disposition reasonable. Radha Castro and I have discussed the diagnosis and risks, and we agree with discharging home to follow-up with their primary doctor or the referral orthopedist. We also discussed returning to the Emergency Department immediately if new or worsening symptoms occur. We have discussed the symptoms which are most concerning (e.g., changing or worsening pain, numbness, weakness) that necessitate immediate return. During the patient's ED course, the patient was given:  Medications   clindamycin (CLEOCIN) capsule 300 mg (300 mg Oral Given 8/4/21 2304)   naproxen (NAPROSYN) tablet 500 mg (500 mg Oral Given 8/4/21 2304)        CLINICAL IMPRESSION  1. Cellulitis of right lower extremity        Blood pressure (!) 154/92, pulse 107, temperature 98.1 °F (36.7 °C), temperature source Oral, resp. rate 18, height 6' 4\" (1.93 m), weight 280 lb (127 kg), SpO2 95 %. DISPOSITION  Radha Castro was discharged to home in stable condition. Patient was given scripts for the following medications. I counseled patient how to take these medications. Discharge Medication List as of 8/4/2021 11:07 PM      START taking these medications    Details   clindamycin (CLEOCIN) 300 MG capsule Take 1 capsule by mouth 4 times daily for 10 days, Disp-40 capsule, R-0Normal      !! naproxen (NAPROSYN) 250 MG tablet Take 1 tablet by mouth 2 times daily as needed for Pain Take with food, Disp-20 tablet, R-0Normal       !! - Potential duplicate medications found. Please discuss with provider.           Follow-up with:  No follow-up provider specified. DISCLAIMER: This chart was created using Dragon dictation software. Efforts were made by me to ensure accuracy, however some errors may be present due to limitations of this technology and occasionally words are not transcribed correctly.         Jazlyn Ledezma MD  08/04/21 6684

## 2021-10-20 ENCOUNTER — HOSPITAL ENCOUNTER (EMERGENCY)
Age: 31
Discharge: HOME | End: 2021-10-20
Attending: EMERGENCY MEDICINE
Payer: COMMERCIAL

## 2021-10-20 VITALS
OXYGEN SATURATION: 96 % | WEIGHT: 263.7 LBS | DIASTOLIC BLOOD PRESSURE: 49 MMHG | SYSTOLIC BLOOD PRESSURE: 118 MMHG | RESPIRATION RATE: 16 BRPM | BODY MASS INDEX: 32.11 KG/M2 | TEMPERATURE: 98.2 F | HEIGHT: 76 IN | HEART RATE: 53 BPM

## 2021-10-20 DIAGNOSIS — L03.115 CELLULITIS OF RIGHT FOOT: Primary | ICD-10-CM

## 2021-10-20 LAB
A/G RATIO: 1 (ref 1.1–2.2)
ALBUMIN SERPL-MCNC: 3.7 G/DL (ref 3.4–5)
ALP BLD-CCNC: 100 U/L (ref 40–129)
ALT SERPL-CCNC: <5 U/L (ref 10–40)
ANION GAP SERPL CALCULATED.3IONS-SCNC: 10 MMOL/L (ref 3–16)
AST SERPL-CCNC: 18 U/L (ref 15–37)
BASOPHILS ABSOLUTE: 0.1 K/UL (ref 0–0.2)
BASOPHILS RELATIVE PERCENT: 1.6 %
BILIRUB SERPL-MCNC: <0.2 MG/DL (ref 0–1)
BUN BLDV-MCNC: 8 MG/DL (ref 7–20)
CALCIUM SERPL-MCNC: 9.3 MG/DL (ref 8.3–10.6)
CHLORIDE BLD-SCNC: 103 MMOL/L (ref 99–110)
CO2: 23 MMOL/L (ref 21–32)
CREAT SERPL-MCNC: 0.7 MG/DL (ref 0.9–1.3)
EOSINOPHILS ABSOLUTE: 0.2 K/UL (ref 0–0.6)
EOSINOPHILS RELATIVE PERCENT: 3.8 %
GFR AFRICAN AMERICAN: >60
GFR NON-AFRICAN AMERICAN: >60
GLOBULIN: 3.6 G/DL
GLUCOSE BLD-MCNC: 130 MG/DL (ref 70–99)
HCT VFR BLD CALC: 37 % (ref 40.5–52.5)
HEMOGLOBIN: 12.2 G/DL (ref 13.5–17.5)
LACTIC ACID: 1 MMOL/L (ref 0.4–2)
LYMPHOCYTES ABSOLUTE: 1.9 K/UL (ref 1–5.1)
LYMPHOCYTES RELATIVE PERCENT: 33.4 %
MCH RBC QN AUTO: 26.9 PG (ref 26–34)
MCHC RBC AUTO-ENTMCNC: 33 G/DL (ref 31–36)
MCV RBC AUTO: 81.5 FL (ref 80–100)
MONOCYTES ABSOLUTE: 0.2 K/UL (ref 0–1.3)
MONOCYTES RELATIVE PERCENT: 4 %
NEUTROPHILS ABSOLUTE: 3.3 K/UL (ref 1.7–7.7)
NEUTROPHILS RELATIVE PERCENT: 57.2 %
PDW BLD-RTO: 15.6 % (ref 12.4–15.4)
PLATELET # BLD: 203 K/UL (ref 135–450)
PMV BLD AUTO: 7.5 FL (ref 5–10.5)
POTASSIUM SERPL-SCNC: 4.5 MMOL/L (ref 3.5–5.1)
RBC # BLD: 4.54 M/UL (ref 4.2–5.9)
SODIUM BLD-SCNC: 136 MMOL/L (ref 136–145)
TOTAL PROTEIN: 7.3 G/DL (ref 6.4–8.2)
WBC # BLD: 5.8 K/UL (ref 4–11)

## 2021-10-20 PROCEDURE — 6360000002 HC RX W HCPCS: Performed by: EMERGENCY MEDICINE

## 2021-10-20 PROCEDURE — 87186 SC STD MICRODIL/AGAR DIL: CPT

## 2021-10-20 PROCEDURE — 96365 THER/PROPH/DIAG IV INF INIT: CPT

## 2021-10-20 PROCEDURE — 2500000003 HC RX 250 WO HCPCS: Performed by: EMERGENCY MEDICINE

## 2021-10-20 PROCEDURE — 85025 COMPLETE CBC W/AUTO DIFF WBC: CPT

## 2021-10-20 PROCEDURE — 87077 CULTURE AEROBIC IDENTIFY: CPT

## 2021-10-20 PROCEDURE — 80053 COMPREHEN METABOLIC PANEL: CPT

## 2021-10-20 PROCEDURE — 83605 ASSAY OF LACTIC ACID: CPT

## 2021-10-20 PROCEDURE — 2580000003 HC RX 258: Performed by: EMERGENCY MEDICINE

## 2021-10-20 PROCEDURE — 96367 TX/PROPH/DG ADDL SEQ IV INF: CPT

## 2021-10-20 PROCEDURE — 36415 COLL VENOUS BLD VENIPUNCTURE: CPT

## 2021-10-20 PROCEDURE — 87150 DNA/RNA AMPLIFIED PROBE: CPT

## 2021-10-20 PROCEDURE — 87040 BLOOD CULTURE FOR BACTERIA: CPT

## 2021-10-20 PROCEDURE — 99283 EMERGENCY DEPT VISIT LOW MDM: CPT

## 2021-10-20 RX ORDER — NAPROXEN 500 MG/1
500 TABLET ORAL 2 TIMES DAILY WITH MEALS
Qty: 30 TABLET | Refills: 0 | Status: SHIPPED | OUTPATIENT
Start: 2021-10-20

## 2021-10-20 RX ORDER — DOXYCYCLINE HYCLATE 100 MG
100 TABLET ORAL 2 TIMES DAILY
Qty: 14 TABLET | Refills: 0 | Status: SHIPPED | OUTPATIENT
Start: 2021-10-20 | End: 2021-10-27

## 2021-10-20 RX ORDER — AMOXICILLIN AND CLAVULANATE POTASSIUM 875; 125 MG/1; MG/1
1 TABLET, FILM COATED ORAL 2 TIMES DAILY
Qty: 14 TABLET | Refills: 0 | Status: SHIPPED | OUTPATIENT
Start: 2021-10-20 | End: 2021-10-27

## 2021-10-20 RX ADMIN — DEXTROSE MONOHYDRATE 3375 MG: 5 INJECTION INTRAVENOUS at 21:52

## 2021-10-20 RX ADMIN — DEXTROSE MONOHYDRATE 1000 MG: 50 INJECTION, SOLUTION INTRAVENOUS at 22:25

## 2021-10-20 ASSESSMENT — PAIN DESCRIPTION - DESCRIPTORS: DESCRIPTORS: THROBBING;BURNING

## 2021-10-20 ASSESSMENT — PAIN DESCRIPTION - PAIN TYPE: TYPE: ACUTE PAIN

## 2021-10-20 ASSESSMENT — PAIN SCALES - GENERAL: PAINLEVEL_OUTOF10: 7

## 2021-10-20 ASSESSMENT — PAIN DESCRIPTION - ORIENTATION: ORIENTATION: RIGHT

## 2021-10-20 ASSESSMENT — PAIN DESCRIPTION - LOCATION: LOCATION: FOOT

## 2021-10-20 NOTE — ED PROVIDER NOTES
[facility]  EMERGENCY DEPT VISIT      Patient Identification  Светлана Carlos is a 32 y.o. male. Chief Complaint   Wound Infection (pt seen at [facility] ER 10/17 and dx with cellulitis right foot. . pt reports open wound between right great toe and 2nd toe since 8/2021. +yellowish drainage. no fever uses iv heroin, denies shooting up in feet. . pt sts signed out AMA +painful)      History of Present Illness: This is a  32 y.o. male who presents ambulatory  to the ED with complaints of chronic wound to the right foot. Patient states that he thinks it has been infected for about 4 months. He states it started out as athlete's foot and in addition he was wearing cheap thongs which cut into his first webspace. Patient was treated as an outpatient in August but the redness never went away. He was actually admitted to [facility] for cellulitis 3 days ago however left AMA before completion of his antibiotic course and he did not receive a prescription for antibiotics upon departure. Patient states that he was frustrated because they kept asking about his IV drug use. He states that he is afraid that he will lose his toes. He denies any fever. There has been drainage from the foot. No nausea or vomiting. No fever. His feet are always swollen. He states that he is homeless. Review of records from [facility] show no wound culture however he was MRSA positive on a nasal swab. CT imaging was performed without evidence of deep abscess formation. Past Medical History:   Diagnosis Date    IV drug abuse ([facility] [address]     heroin    Seizures ([facility] [address]        History reviewed. No pertinent surgical history.       Current Facility-Administered Medications:     vancomycin 1000 MG IVPB in 250 mL D5W add-vantage, 1,000 mg, IntraVENous, Once, Cherelle Macario MD    piperacillin-tazobactam (ZOSYN) 3,375 mg in dextrose 5 % 50 mL IVPB (mini-bag), 3,375 mg, IntraVENous, Once, Cherelle Macario MD    Current Outpatient Medications:     naproxen (NAPROSYN) 250 MG tablet, Take 1 tablet by mouth 2 times daily as needed for Pain Take with food, Disp: 20 tablet, Rfl: 0    methadone (DOLOPHINE) 10 MG tablet, Take 130 mg by mouth daily. , Disp: , Rfl:     gabapentin (NEURONTIN) 800 MG tablet, Take 800 mg by mouth 4 times daily. , Disp: , Rfl:     levETIRAcetam (KEPPRA) 500 MG tablet, Take 1.5 tablets by mouth 2 times daily for 10 days, Disp: 30 tablet, Rfl: 0    naproxen (NAPROSYN) 500 MG tablet, Take 1 tablet by mouth 2 times daily, Disp: 30 tablet, Rfl: 0    albuterol sulfate HFA (PROVENTIL HFA) 108 (90 BASE) MCG/ACT inhaler, Inhale 2 puffs into the lungs every 4 hours as needed for Wheezing, Disp: 1 Inhaler, Rfl: 0    Allergies   Allergen Reactions    Bactrim [Sulfamethoxazole-Trimethoprim]        Social History     Socioeconomic History    Marital status: Single     Spouse name: Not on file    Number of children: Not on file    Years of education: Not on file    Highest education level: Not on file   Occupational History    Not on file   Tobacco Use    Smoking status: Current Every Day Smoker     Packs/day: 1.00    Smokeless tobacco: Never Used   Substance and Sexual Activity    Alcohol use: No    Drug use: Yes     Comment: hx heroin    Sexual activity: Yes     Partners: Female   Other Topics Concern    Not on file   Social History Narrative    Not on file     Social Determinants of Health     Financial Resource Strain:     Difficulty of Paying Living Expenses:    Food Insecurity:     Worried About Running Out of Food in the Last Year:     Ran Out of Food in the Last Year:    Transportation Needs:     Lack of Transportation (Medical):      Lack of Transportation (Non-Medical):    Physical Activity:     Days of Exercise per Week:     Minutes of Exercise per Session:    Stress:     Feeling of Stress :    Social Connections:     Frequency of Communication with Friends and Family:     nondistended, positive bowel sounds. abdomen is nontender. No rebound. no guarding. MUSCULOSKELETAL: The calves are nontender to palpation. Active range of motion of the upper and lower extremities. bilaterl 1 plus pedal edema. Right foot with swelling and tendenress to great toe and second toe with evidence of infection. NEUROLOGICAL: Awake, alert and oriented x 3. Power intact in the upper and lower extremities. DERMATOLOGIC: No petechiae, rashes, or ecchymoses. left foot with erythema and crusted exudate and eschar over first and second toes with 2cm wound at fist webspace. No expressible drainage. PSYCH: normal mood and affect. Normal thought content. ED COURSE AND MEDICAL DECISION MAKING:  I have reviewed Hodan Castro's old records which reveal the following pertinent information:      Interface, Rad Results In - 10/17/2021 10:29 PM EDT   Formatting of this note might be different from the original.   HISTORY:  Cellulitis, toe   Cellulitis with skin breakdown and swelling of first and second toe, evaluate for abscess or deep space infection   COMPARISON: No similar   TECHNIQUE: Multiplanar multisequence CT images of the foot   NOTE:  If there are questions about the content of this report, please contact [address] [place] [address] radiology by calling [phone]     FINDINGS:   BONES:  Unremarkable.  No fracture, aggressive osseous lesion, or osseous coalition   TIBIOTALAR JOINT: Unremarkable.  No osteochondral abnormality   SUBTALAR JOINTS: Unremarkable.  No tarsal coalition   MID-FOOT JOINTS:  Unremarkable.  Anatomic alignment     SOFT TISSUES/MUSCLES:  There is soft tissue swelling primarily within the great toe and second toe but no deep abscess, or soft tissue gas. No radiopaque foreign body.    SINUS TARSI: Unremarkable   PLANTAR FASCIA:  Grossly intact   ACHILLES TENDON:  Grossly intact   EXTENSOR TENDONS:  Grossly intact   FLEXOR TENDONS:  Grossly intact       IMPRESSION       Soft tissue inflammation

## 2021-10-21 ENCOUNTER — APPOINTMENT (OUTPATIENT)
Dept: GENERAL RADIOLOGY | Age: 31
End: 2021-10-21
Payer: COMMERCIAL

## 2021-10-21 ENCOUNTER — HOSPITAL ENCOUNTER (EMERGENCY)
Age: 31
Discharge: HOME | End: 2021-10-21
Payer: COMMERCIAL

## 2021-10-21 VITALS
BODY MASS INDEX: 31.78 KG/M2 | HEART RATE: 76 BPM | DIASTOLIC BLOOD PRESSURE: 74 MMHG | OXYGEN SATURATION: 97 % | TEMPERATURE: 98.2 F | RESPIRATION RATE: 16 BRPM | SYSTOLIC BLOOD PRESSURE: 134 MMHG | HEIGHT: 76 IN | WEIGHT: 261 LBS

## 2021-10-21 DIAGNOSIS — L03.115 CELLULITIS OF RIGHT FOOT: Primary | ICD-10-CM

## 2021-10-21 LAB
ANION GAP SERPL CALCULATED.3IONS-SCNC: 8 MMOL/L (ref 3–16)
BASOPHILS ABSOLUTE: 0 K/UL (ref 0–0.2)
BASOPHILS RELATIVE PERCENT: 0.3 %
BUN BLDV-MCNC: 9 MG/DL (ref 7–20)
CALCIUM SERPL-MCNC: 9.3 MG/DL (ref 8.3–10.6)
CHLORIDE BLD-SCNC: 102 MMOL/L (ref 99–110)
CO2: 27 MMOL/L (ref 21–32)
CREAT SERPL-MCNC: 0.9 MG/DL (ref 0.9–1.3)
EOSINOPHILS ABSOLUTE: 0.1 K/UL (ref 0–0.6)
EOSINOPHILS RELATIVE PERCENT: 0.6 %
GFR AFRICAN AMERICAN: >60
GFR NON-AFRICAN AMERICAN: >60
GLUCOSE BLD-MCNC: 100 MG/DL (ref 70–99)
HCT VFR BLD CALC: 37.5 % (ref 40.5–52.5)
HEMOGLOBIN: 11.9 G/DL (ref 13.5–17.5)
LACTIC ACID, SEPSIS: 1.5 MMOL/L (ref 0.4–1.9)
LYMPHOCYTES ABSOLUTE: 1.4 K/UL (ref 1–5.1)
LYMPHOCYTES RELATIVE PERCENT: 14.5 %
MCH RBC QN AUTO: 26.3 PG (ref 26–34)
MCHC RBC AUTO-ENTMCNC: 31.6 G/DL (ref 31–36)
MCV RBC AUTO: 83.1 FL (ref 80–100)
MONOCYTES ABSOLUTE: 0.5 K/UL (ref 0–1.3)
MONOCYTES RELATIVE PERCENT: 4.9 %
NEUTROPHILS ABSOLUTE: 7.4 K/UL (ref 1.7–7.7)
NEUTROPHILS RELATIVE PERCENT: 79.7 %
PDW BLD-RTO: 16.1 % (ref 12.4–15.4)
PLATELET # BLD: 191 K/UL (ref 135–450)
PMV BLD AUTO: 7.5 FL (ref 5–10.5)
POTASSIUM REFLEX MAGNESIUM: 3.9 MMOL/L (ref 3.5–5.1)
RBC # BLD: 4.51 M/UL (ref 4.2–5.9)
REPORT: NORMAL
SODIUM BLD-SCNC: 137 MMOL/L (ref 136–145)
WBC # BLD: 9.3 K/UL (ref 4–11)

## 2021-10-21 PROCEDURE — 6370000000 HC RX 637 (ALT 250 FOR IP): Performed by: PHYSICIAN ASSISTANT

## 2021-10-21 PROCEDURE — 87070 CULTURE OTHR SPECIMN AEROBIC: CPT

## 2021-10-21 PROCEDURE — 99285 EMERGENCY DEPT VISIT HI MDM: CPT

## 2021-10-21 PROCEDURE — 83605 ASSAY OF LACTIC ACID: CPT

## 2021-10-21 PROCEDURE — 6360000002 HC RX W HCPCS: Performed by: PHYSICIAN ASSISTANT

## 2021-10-21 PROCEDURE — 96374 THER/PROPH/DIAG INJ IV PUSH: CPT

## 2021-10-21 PROCEDURE — 85025 COMPLETE CBC W/AUTO DIFF WBC: CPT

## 2021-10-21 PROCEDURE — 87205 SMEAR GRAM STAIN: CPT

## 2021-10-21 PROCEDURE — 36415 COLL VENOUS BLD VENIPUNCTURE: CPT

## 2021-10-21 PROCEDURE — 96375 TX/PRO/DX INJ NEW DRUG ADDON: CPT

## 2021-10-21 PROCEDURE — 80048 BASIC METABOLIC PNL TOTAL CA: CPT

## 2021-10-21 PROCEDURE — 87186 SC STD MICRODIL/AGAR DIL: CPT

## 2021-10-21 PROCEDURE — 73630 X-RAY EXAM OF FOOT: CPT

## 2021-10-21 PROCEDURE — 2500000003 HC RX 250 WO HCPCS: Performed by: PHYSICIAN ASSISTANT

## 2021-10-21 PROCEDURE — 87077 CULTURE AEROBIC IDENTIFY: CPT

## 2021-10-21 PROCEDURE — 2580000003 HC RX 258: Performed by: PHYSICIAN ASSISTANT

## 2021-10-21 RX ORDER — AMOXICILLIN AND CLAVULANATE POTASSIUM 875; 125 MG/1; MG/1
1 TABLET, FILM COATED ORAL ONCE
Status: COMPLETED | OUTPATIENT
Start: 2021-10-21 | End: 2021-10-21

## 2021-10-21 RX ORDER — DOXYCYCLINE HYCLATE 100 MG
100 TABLET ORAL ONCE
Status: COMPLETED | OUTPATIENT
Start: 2021-10-21 | End: 2021-10-21

## 2021-10-21 RX ORDER — CLINDAMYCIN PHOSPHATE 600 MG/50ML
600 INJECTION INTRAVENOUS ONCE
Status: DISCONTINUED | OUTPATIENT
Start: 2021-10-21 | End: 2021-10-21

## 2021-10-21 RX ORDER — 0.9 % SODIUM CHLORIDE 0.9 %
1000 INTRAVENOUS SOLUTION INTRAVENOUS ONCE
Status: DISCONTINUED | OUTPATIENT
Start: 2021-10-21 | End: 2021-10-21 | Stop reason: HOSPADM

## 2021-10-21 RX ORDER — KETOROLAC TROMETHAMINE 30 MG/ML
30 INJECTION, SOLUTION INTRAMUSCULAR; INTRAVENOUS ONCE
Status: COMPLETED | OUTPATIENT
Start: 2021-10-21 | End: 2021-10-21

## 2021-10-21 RX ADMIN — DOXYCYCLINE HYCLATE 100 MG: 100 TABLET, COATED ORAL at 16:06

## 2021-10-21 RX ADMIN — KETOROLAC TROMETHAMINE 30 MG: 30 INJECTION, SOLUTION INTRAMUSCULAR at 15:28

## 2021-10-21 RX ADMIN — AMOXICILLIN AND CLAVULANATE POTASSIUM 1 TABLET: 875; 125 TABLET, FILM COATED ORAL at 16:06

## 2021-10-21 ASSESSMENT — PAIN DESCRIPTION - PAIN TYPE
TYPE: ACUTE PAIN

## 2021-10-21 ASSESSMENT — PAIN DESCRIPTION - LOCATION
LOCATION: FOOT

## 2021-10-21 ASSESSMENT — PAIN - FUNCTIONAL ASSESSMENT
PAIN_FUNCTIONAL_ASSESSMENT: PREVENTS OR INTERFERES SOME ACTIVE ACTIVITIES AND ADLS
PAIN_FUNCTIONAL_ASSESSMENT: ACTIVITIES ARE NOT PREVENTED
PAIN_FUNCTIONAL_ASSESSMENT: 0-10

## 2021-10-21 ASSESSMENT — PAIN SCALES - GENERAL
PAINLEVEL_OUTOF10: 7
PAINLEVEL_OUTOF10: 7
PAINLEVEL_OUTOF10: 5
PAINLEVEL_OUTOF10: 7
PAINLEVEL_OUTOF10: 7

## 2021-10-21 ASSESSMENT — PAIN DESCRIPTION - ORIENTATION
ORIENTATION: RIGHT

## 2021-10-21 ASSESSMENT — PAIN DESCRIPTION - DESCRIPTORS
DESCRIPTORS: BURNING;ACHING
DESCRIPTORS: ACHING;BURNING
DESCRIPTORS: BURNING;ACHING

## 2021-10-21 ASSESSMENT — PAIN DESCRIPTION - FREQUENCY
FREQUENCY: CONTINUOUS

## 2021-10-21 ASSESSMENT — PAIN DESCRIPTION - PROGRESSION: CLINICAL_PROGRESSION: NOT CHANGED

## 2021-10-21 ASSESSMENT — PAIN DESCRIPTION - ONSET: ONSET: ON-GOING

## 2021-10-21 NOTE — ED NOTES
Bed: [facility]  Expected date:   Expected time:   Means of arrival:   Comments:  #2     Trisha Morgan RN  10/21/21 0309

## 2021-10-21 NOTE — ED NOTES
USGPIV infiltrated with IV fluids.  Kailey Arevalo ED PA made aware       Karli Begum RN  10/21/21 9618

## 2021-10-21 NOTE — ED PROVIDER NOTES
[address] [facility]        Pt Name: Willis Milian  MRN: [id number]  [place] 1990  Date of evaluation: 10/21/2021  Provider: KETURAH Romero  PCP: No primary care provider on file. Note Started: 5:13 PM EDT     The ED Attending Physician was available for consultation but did not see or evaluate this patient. CHIEF COMPLAINT       Chief Complaint   Patient presents with    Abscess     pt states \"i started with athletes foot and it just keeps getting worse. \". open  wound between toes with slough and tan drainage. pt was seen at [address] [place] [address] last night. pt admits to using heroine and fentanyl through injection but is adament he isn't injecting into feet/ arms only. last use two days ago. HISTORY OF PRESENT ILLNESS   (Location, Timing/Onset, Context/Setting, Quality, Duration, Modifying Factors, Severity, Associated Signs and Symptoms)  Note limiting factors. Chief Complaint: Foot infection    Willis Milian is a 32 y.o. male who presents with complaint of pain and swelling in the right foot. Patient was seen in another emergency department last night and discharged, and he has had other recent visits for cellulitis, records showing that he has had problems with compliance. He was given IV antibiotics at his ED visit yesterday, he is not septic, he was discharged with prescriptions for antibiotic treatment. He says he has not yet gotten those prescriptions filled. He says he is homeless, he is having trouble getting into a pharmacy along paying for the medications. He denies fever or feelings of general illness. Says the foot is consistently painful, swollen, and there appears to be a little bit of pus discharge. Has history of IV drug abuse which he admits to, but he says he has never injected into the affected foot.     Nursing Notes were all reviewed and agreed with or any disagreements were addressed in the HPI.    REVIEW OF SYSTEMS    (2-9 systems for level 4, 10 or more for level 5)     Review of Systems    Positives and pertinent negatives as per HPI. PAST MEDICAL HISTORY     Past Medical History:   Diagnosis Date    IV drug abuse ([facility] [address]     heroin    Seizures ([facility] [address]        SURGICAL HISTORY   History reviewed. No pertinent surgical history. [address]       Discharge Medication List as of 10/21/2021  4:43 PM      CONTINUE these medications which have NOT CHANGED    Details   doxycycline hyclate (VIBRA-TABS) 100 MG tablet Take 1 tablet by mouth 2 times daily for 7 days, Disp-14 tablet, R-0Print      amoxicillin-clavulanate (AUGMENTIN) 875-125 MG per tablet Take 1 tablet by mouth 2 times daily for 7 days, Disp-14 tablet, R-0Print      naproxen (NAPROSYN) 500 MG tablet Take 1 tablet by mouth 2 times daily (with meals), Disp-30 tablet, R-0Print      methadone (DOLOPHINE) 10 MG tablet Take 130 mg by mouth daily. Historical Med      gabapentin (NEURONTIN) 800 MG tablet Take 800 mg by mouth 4 times daily. Historical Med      levETIRAcetam (KEPPRA) 500 MG tablet Take 1.5 tablets by mouth 2 times daily for 10 days, Disp-30 tablet, R-0Normal      albuterol sulfate HFA (PROVENTIL HFA) 108 (90 BASE) MCG/ACT inhaler Inhale 2 puffs into the lungs every 4 hours as needed for Wheezing, Disp-1 Inhaler, R-0             ALLERGIES     Bactrim [sulfamethoxazole-trimethoprim]    FAMILYHISTORY     History reviewed. No pertinent family history.      SOCIAL HISTORY       Social History     Tobacco Use    Smoking status: Current Every Day Smoker     Packs/day: 1.00    Smokeless tobacco: Never Used   Substance Use Topics    Alcohol use: No    Drug use: Yes     Comment: hx heroin       SCREENINGS           PHYSICAL EXAM    (up to 7 for level 4, 8 or more for level 5)     ED Triage Vitals [10/21/21 1352]   BP Temp Temp Source Pulse Resp SpO2 Height Weight   125/77 97.6 °F (36.4 °C) Temporal 70 16 97 % 6' 4\" (1.93 m) 261 lb 14.5 oz (118.8 kg)       Physical Exam  Vitals and nursing note reviewed. Constitutional:       General: He is not in acute distress. Appearance: Normal appearance. He is not ill-appearing. HENT:      Head: Normocephalic and atraumatic. Nose: Nose normal.   Eyes:      General:         Right eye: No discharge. Left eye: No discharge. Pulmonary:      Effort: Pulmonary effort is normal. No respiratory distress. Musculoskeletal:         General: Normal range of motion. Cervical back: Normal range of motion. Comments: There is erythema noted to the distal right foot and the first and second toes, with some skin breakdown slight pustular discharge in the interdigital space. See image in yesterday's documentation from Dr. Natalia Brewer, as the foot appears unchanged today. Skin:     General: Skin is warm and dry. Neurological:      General: No focal deficit present. Mental Status: He is alert and oriented to person, place, and time.    Psychiatric:         Mood and Affect: Mood normal.         Behavior: Behavior normal.         DIAGNOSTIC RESULTS   LABS:    Labs Reviewed   CBC WITH AUTO DIFFERENTIAL - Abnormal; Notable for the following components:       Result Value    Hemoglobin 11.9 (*)     Hematocrit 37.5 (*)     RDW 16.1 (*)     All other components within normal limits    Narrative:     Performed at:  [address] [address]   Phone [phone]   BASIC METABOLIC PANEL W/ REFLEX TO MG FOR LOW K - Abnormal; Notable for the following components:    Glucose 100 (*)     All other components within normal limits    Narrative:     Performed at:  [facility]  [address] [facility] [place] [address]   Phone [phone]   CULTURE, WOUND   LACTATE, SEPSIS    Narrative:     Performed at:  [address] [address]   Phone [phone]   LACTATE, SEPSIS       When ordered only abnormal lab results are displayed. All other labs were within normal range or not returned as of this dictation. EKG: When ordered, EKG's are interpreted by the Emergency Department Physician in the absence of a cardiologist.  Please see their note for interpretation of EKG. RADIOLOGY:   Non-plain film images such as CT, Ultrasound and MRI are read by the radiologist. Plain radiographic images are visualized and preliminarily interpreted by the ED Provider with the below findings:    Interpretation per the Radiologist below, if available at the time of this note:    XR FOOT RIGHT (MIN 3 VIEWS)   Final Result   Soft tissue swelling but no evidence of soft tissue gas or osteomyelitis. No   fracture. CONSULTS:  IP CONSULT TO SOCIAL WORK    PROCEDURES   Unless otherwise noted below, none. Procedures    EMERGENCY DEPARTMENT COURSE and DIFFERENTIAL DIAGNOSIS/MDM:   Vitals:    Vitals:    10/21/21 1352 10/21/21 1428 10/21/21 1615 10/21/21 1643   BP: 125/77  (!) 143/95 134/74   Pulse: 70 68  76   Resp: 16 18  16   Temp: 97.6 °F (36.4 °C) 99.2 °F (37.3 °C)  98.2 °F (36.8 °C)   TempSrc: Temporal Oral  Oral   SpO2: 97% 97%     Weight: 261 lb 14.5 oz (118.8 kg) 261 lb (118.4 kg)     Height: 6' 4\" (1.93 m) 6' 4\" (1.93 m)         Patient was given the following medications:  Medications   0.9 % sodium chloride bolus (0 mLs IntraVENous Paused 10/21/21 1522)   ketorolac (TORADOL) injection 30 mg (30 mg IntraVENous Given 10/21/21 1528)   doxycycline hyclate (VIBRA-TABS) tablet 100 mg (100 mg Oral Given 10/21/21 1606)   amoxicillin-clavulanate (AUGMENTIN) 875-125 MG per tablet 1 tablet (1 tablet Oral Given 10/21/21 1606)           Patient had normal vital signs in the ED, and his lab work-up was again reassuring, he is not septic. Right foot is clearly infected. There was some pus discharge and I sent a sample to the lab for culture.   An IV was placed but the IV blew, so the patient was given oral antibiotics in the ED. He was visited by our , who was able to get the patient's antibiotic prescription from yesterday filled here today, and the patient was given these medications. He and his wife at bedside reports that they are both going into an inpatient treatment facility for drug addiction beginning tomorrow. No indication for further work-up or for hospitalization today. Patient was discharged. The patient verbalized understanding and agreement with this plan of care. The patient was advised to return to the emergency department if symptoms should significantly worsen or if new and concerning symptoms should appear. I estimate there is LOW risk for SEVERE CELLULITIS, SEPSIS or NECROTIZING FASCIITIS, thus I consider the discharge disposition reasonable. CRITICAL CARE TIME   N/A    FINAL IMPRESSION      1.  Cellulitis of right foot          DISPOSITION/PLAN   DISPOSITION Decision To Discharge 10/21/2021 04:43:23 PM      PATIENT REFERRED TO:  Tamica Pack DPM  [address] [id number]  [phone]    Schedule an appointment as soon as possible for a visit   For podiatry follow-up care      DISCHARGE MEDICATIONS:  Discharge Medication List as of 10/21/2021  4:43 PM          DISCONTINUED MEDICATIONS:  Discharge Medication List as of 10/21/2021  4:43 PM               (Please note that portions of this note were completed with a voice recognition program.  Efforts were made to edit the dictations but occasionally words are mis-transcribed.)    KETURAH Cooley (electronically signed)       Nataly Cooleyma  10/21/21 6833 <<----- Click to add NO significant Past Surgical History

## 2021-10-21 NOTE — ED PROVIDER NOTES
Emergency Department Encounter  Location: [address] [place] [address]    Patient: Mary Alice Mendiola  MRN: [id number]  : 1990  Date of evaluation: 10/20/2021  ED Provider: MD Linnette Monsalve Gloria was checked out to me by Dr. Nabil Mcdonough. Please see his/her initial documentation for details of the patient's initial ED presentation, physical exam and completed studies. In brief, Mary Alice Mendiola is a 32 y.o. male who presented to the emergency department  Right foot infection.     Currently studies pending:     I have reviewed and interpreted all of the currently available lab results and diagnostics from this visit:  Results for orders placed or performed during the hospital encounter of 10/20/21   CBC Auto Differential   Result Value Ref Range    WBC 5.8 4.0 - 11.0 K/uL    RBC 4.54 4.20 - 5.90 M/uL    Hemoglobin 12.2 (L) 13.5 - 17.5 g/dL    Hematocrit 37.0 (L) 40.5 - 52.5 %    MCV 81.5 80.0 - 100.0 fL    MCH 26.9 26.0 - 34.0 pg    MCHC 33.0 31.0 - 36.0 g/dL    RDW 15.6 (H) 12.4 - 15.4 %    Platelets 843 339 - 680 K/uL    MPV 7.5 5.0 - 10.5 fL    Neutrophils % 57.2 %    Lymphocytes % 33.4 %    Monocytes % 4.0 %    Eosinophils % 3.8 %    Basophils % 1.6 %    Neutrophils Absolute 3.3 1.7 - 7.7 K/uL    Lymphocytes Absolute 1.9 1.0 - 5.1 K/uL    Monocytes Absolute 0.2 0.0 - 1.3 K/uL    Eosinophils Absolute 0.2 0.0 - 0.6 K/uL    Basophils Absolute 0.1 0.0 - 0.2 K/uL   Comprehensive Metabolic Panel   Result Value Ref Range    Sodium 136 136 - 145 mmol/L    Potassium 4.5 3.5 - 5.1 mmol/L    Chloride 103 99 - 110 mmol/L    CO2 23 21 - 32 mmol/L    Anion Gap 10 3 - 16    Glucose 130 (H) 70 - 99 mg/dL    BUN 8 7 - 20 mg/dL    CREATININE 0.7 (L) 0.9 - 1.3 mg/dL    GFR Non-African American >60 >60    GFR African American >60 >60    Calcium 9.3 8.3 - 10.6 mg/dL    Total Protein 7.3 6.4 - 8.2 g/dL    Albumin 3.7 3.4 - 5.0 g/dL    Albumin/Globulin Ratio 1.0 (L) 1.1 - 2.2    Total Bilirubin <0.2 0.0 - 1.0 mg/dL    Alkaline Phosphatase 100 40 - 129 U/L    ALT <5 (L) 10 - 40 U/L    AST 18 15 - 37 U/L    Globulin 3.6 Not Established g/dL   Lactic Acid, Plasma   Result Value Ref Range    Lactic Acid 1.0 0.4 - 2.0 mmol/L     No results found. Final ED Course and MDM:          No leukocytosis, no lactic acidosis, no white count. He does not have a fever. No tachycardia. He is not hypotensive. He is given IV antibiotics and will be discharge of oral antibiotics. Follow-up with podiatry is given. Final Impression      1.  Cellulitis of right foot        DISPOSITION Decision To Discharge 10/20/2021 11:26:02 PM     (Please note that portions of this note may have been completed with a voice recognition program. Efforts were made to edit the dictations but occasionally words are mis-transcribed.)    Rehan Corcoran MD  [address] [place] [address], MD  10/21/21 0134

## 2021-10-21 NOTE — CARE COORDINATION
SW consult for RX assistance:  SW took pt's Rx to retail Pharm they will fill and either tube to ED or Call SW to . Patient has [employer], if pérez is needed - Retail to fill.   Kenny HAYES,MSW  [phone]

## 2021-10-22 NOTE — ED NOTES
Attempted to call patient about blood culture reports. No answer. Was able to leave message to call back.       David Arrington, RN  10/21/21 1127

## 2021-10-23 LAB
BLOOD CULTURE, ROUTINE: ABNORMAL
BLOOD CULTURE, ROUTINE: ABNORMAL
ORGANISM: ABNORMAL

## 2021-10-24 LAB
GRAM STAIN RESULT: ABNORMAL
ORGANISM: ABNORMAL
WOUND/ABSCESS: ABNORMAL

## 2021-10-24 NOTE — RESULT ENCOUNTER NOTE
Culture reviewed, wound culture with mixed doyle including Pseudomonas, staph aureus, Proteus. Likely susceptible to prescribed antibiotics however please call patient to confirm infection is improving.

## 2023-10-12 ENCOUNTER — HOSPITAL ENCOUNTER (EMERGENCY)
Age: 33
Discharge: HOME | End: 2023-10-12
Attending: EMERGENCY MEDICINE

## 2023-10-12 VITALS
DIASTOLIC BLOOD PRESSURE: 84 MMHG | RESPIRATION RATE: 18 BRPM | OXYGEN SATURATION: 97 % | BODY MASS INDEX: 30.44 KG/M2 | TEMPERATURE: 97.9 F | WEIGHT: 250 LBS | HEIGHT: 76 IN | SYSTOLIC BLOOD PRESSURE: 162 MMHG | HEART RATE: 88 BPM

## 2023-10-12 DIAGNOSIS — M54.31 SCIATICA OF RIGHT SIDE: Primary | ICD-10-CM

## 2023-10-12 PROCEDURE — 99283 EMERGENCY DEPT VISIT LOW MDM: CPT

## 2023-10-12 RX ORDER — NAPROXEN 500 MG/1
500 TABLET ORAL 2 TIMES DAILY WITH MEALS
Qty: 28 TABLET | Refills: 0 | Status: SHIPPED | OUTPATIENT
Start: 2023-10-12

## 2023-10-12 RX ORDER — METHOCARBAMOL 750 MG/1
750 TABLET, FILM COATED ORAL 4 TIMES DAILY
Qty: 40 TABLET | Refills: 0 | Status: SHIPPED | OUTPATIENT
Start: 2023-10-12 | End: 2023-10-22

## 2023-10-12 RX ORDER — PREDNISONE 20 MG/1
40 TABLET ORAL DAILY
Qty: 10 TABLET | Refills: 0 | Status: SHIPPED | OUTPATIENT
Start: 2023-10-12 | End: 2023-10-17

## 2023-10-12 ASSESSMENT — ENCOUNTER SYMPTOMS: BACK PAIN: 1

## 2023-10-12 ASSESSMENT — PAIN DESCRIPTION - LOCATION: LOCATION: BACK

## 2023-10-12 ASSESSMENT — PAIN DESCRIPTION - DESCRIPTORS: DESCRIPTORS: SHARP;SHOOTING

## 2023-10-12 ASSESSMENT — PAIN DESCRIPTION - PAIN TYPE: TYPE: ACUTE PAIN

## 2023-10-12 ASSESSMENT — PAIN SCALES - GENERAL: PAINLEVEL_OUTOF10: 8

## 2023-10-12 ASSESSMENT — PAIN - FUNCTIONAL ASSESSMENT: PAIN_FUNCTIONAL_ASSESSMENT: 0-10
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
## 2023-10-16 ENCOUNTER — HOSPITAL ENCOUNTER (EMERGENCY)
Age: 33
Discharge: HOME | End: 2023-10-16
Payer: MEDICAID

## 2023-10-16 VITALS
SYSTOLIC BLOOD PRESSURE: 157 MMHG | TEMPERATURE: 97.5 F | OXYGEN SATURATION: 96 % | DIASTOLIC BLOOD PRESSURE: 84 MMHG | RESPIRATION RATE: 18 BRPM | HEART RATE: 97 BPM

## 2023-10-16 DIAGNOSIS — M54.31 SCIATICA OF RIGHT SIDE: Primary | ICD-10-CM

## 2023-10-16 PROCEDURE — 99284 EMERGENCY DEPT VISIT MOD MDM: CPT

## 2023-10-16 PROCEDURE — 6360000002 HC RX W HCPCS: Performed by: PHYSICIAN ASSISTANT

## 2023-10-16 PROCEDURE — 96372 THER/PROPH/DIAG INJ SC/IM: CPT

## 2023-10-16 RX ORDER — METHYLPREDNISOLONE 4 MG/1
TABLET ORAL
Qty: 1 KIT | Refills: 0 | Status: SHIPPED | OUTPATIENT
Start: 2023-10-16 | End: 2023-10-19

## 2023-10-16 RX ORDER — KETOROLAC TROMETHAMINE 30 MG/ML
30 INJECTION, SOLUTION INTRAMUSCULAR; INTRAVENOUS ONCE
Status: COMPLETED | OUTPATIENT
Start: 2023-10-16 | End: 2023-10-16

## 2023-10-16 RX ADMIN — KETOROLAC TROMETHAMINE 30 MG: 30 INJECTION, SOLUTION INTRAMUSCULAR; INTRAVENOUS at 16:00

## 2023-10-16 ASSESSMENT — PAIN - FUNCTIONAL ASSESSMENT: PAIN_FUNCTIONAL_ASSESSMENT: 0-10

## 2023-10-16 ASSESSMENT — PAIN SCALES - GENERAL
PAINLEVEL_OUTOF10: 8
PAINLEVEL_OUTOF10: 7

## 2023-10-16 ASSESSMENT — PAIN DESCRIPTION - LOCATION: LOCATION: BACK

## 2023-10-16 ASSESSMENT — PAIN DESCRIPTION - ORIENTATION: ORIENTATION: RIGHT;LOWER

## 2023-10-16 NOTE — DISCHARGE INSTRUCTIONS
Continue Robaxin as prescribed on 10/12. Use your lidoderm patches. Try ice, stretching.     You may need physical therapy or an MRI if symptoms persist.

## 2023-10-19 ENCOUNTER — OFFICE VISIT (OUTPATIENT)
Dept: FAMILY MEDICINE CLINIC | Age: 33
End: 2023-10-19
Payer: MEDICAID

## 2023-10-19 VITALS
BODY MASS INDEX: 31.61 KG/M2 | DIASTOLIC BLOOD PRESSURE: 86 MMHG | WEIGHT: 259.6 LBS | OXYGEN SATURATION: 95 % | HEIGHT: 76 IN | HEART RATE: 94 BPM | SYSTOLIC BLOOD PRESSURE: 122 MMHG

## 2023-10-19 DIAGNOSIS — M54.31 SCIATICA OF RIGHT SIDE: Primary | ICD-10-CM

## 2023-10-19 DIAGNOSIS — Z76.89 ENCOUNTER TO ESTABLISH CARE: ICD-10-CM

## 2023-10-19 DIAGNOSIS — G89.29 CHRONIC RIGHT-SIDED LOW BACK PAIN WITH RIGHT-SIDED SCIATICA: ICD-10-CM

## 2023-10-19 DIAGNOSIS — F19.91 HX OF INTRAVENOUS DRUG USE IN REMISSION: ICD-10-CM

## 2023-10-19 DIAGNOSIS — M54.41 CHRONIC RIGHT-SIDED LOW BACK PAIN WITH RIGHT-SIDED SCIATICA: ICD-10-CM

## 2023-10-19 PROCEDURE — 99204 OFFICE O/P NEW MOD 45 MIN: CPT | Performed by: STUDENT IN AN ORGANIZED HEALTH CARE EDUCATION/TRAINING PROGRAM

## 2023-10-19 RX ORDER — PREDNISONE 10 MG/1
TABLET ORAL
Qty: 17 TABLET | Refills: 0 | Status: SHIPPED | OUTPATIENT
Start: 2023-10-19 | End: 2023-10-30

## 2023-10-19 RX ORDER — TIZANIDINE 4 MG/1
4 TABLET ORAL 3 TIMES DAILY PRN
Qty: 30 TABLET | Refills: 0 | Status: SHIPPED | OUTPATIENT
Start: 2023-10-19

## 2023-10-19 SDOH — ECONOMIC STABILITY: FOOD INSECURITY: WITHIN THE PAST 12 MONTHS, YOU WORRIED THAT YOUR FOOD WOULD RUN OUT BEFORE YOU GOT MONEY TO BUY MORE.: NEVER TRUE

## 2023-10-19 SDOH — ECONOMIC STABILITY: HOUSING INSECURITY
IN THE LAST 12 MONTHS, WAS THERE A TIME WHEN YOU DID NOT HAVE A STEADY PLACE TO SLEEP OR SLEPT IN A SHELTER (INCLUDING NOW)?: NO

## 2023-10-19 SDOH — ECONOMIC STABILITY: INCOME INSECURITY: HOW HARD IS IT FOR YOU TO PAY FOR THE VERY BASICS LIKE FOOD, HOUSING, MEDICAL CARE, AND HEATING?: NOT HARD AT ALL

## 2023-10-19 SDOH — ECONOMIC STABILITY: FOOD INSECURITY: WITHIN THE PAST 12 MONTHS, THE FOOD YOU BOUGHT JUST DIDN'T LAST AND YOU DIDN'T HAVE MONEY TO GET MORE.: NEVER TRUE

## 2023-10-19 ASSESSMENT — PATIENT HEALTH QUESTIONNAIRE - PHQ9
10. IF YOU CHECKED OFF ANY PROBLEMS, HOW DIFFICULT HAVE THESE PROBLEMS MADE IT FOR YOU TO DO YOUR WORK, TAKE CARE OF THINGS AT HOME, OR GET ALONG WITH OTHER PEOPLE: 0
SUM OF ALL RESPONSES TO PHQ QUESTIONS 1-9: 0
3. TROUBLE FALLING OR STAYING ASLEEP: 0
2. FEELING DOWN, DEPRESSED OR HOPELESS: 0
4. FEELING TIRED OR HAVING LITTLE ENERGY: 0
SUM OF ALL RESPONSES TO PHQ QUESTIONS 1-9: 0
9. THOUGHTS THAT YOU WOULD BE BETTER OFF DEAD, OR OF HURTING YOURSELF: 0
1. LITTLE INTEREST OR PLEASURE IN DOING THINGS: 0
SUM OF ALL RESPONSES TO PHQ QUESTIONS 1-9: 0
SUM OF ALL RESPONSES TO PHQ QUESTIONS 1-9: 0
7. TROUBLE CONCENTRATING ON THINGS, SUCH AS READING THE NEWSPAPER OR WATCHING TELEVISION: 0
8. MOVING OR SPEAKING SO SLOWLY THAT OTHER PEOPLE COULD HAVE NOTICED. OR THE OPPOSITE, BEING SO FIGETY OR RESTLESS THAT YOU HAVE BEEN MOVING AROUND A LOT MORE THAN USUAL: 0
SUM OF ALL RESPONSES TO PHQ9 QUESTIONS 1 & 2: 0
6. FEELING BAD ABOUT YOURSELF - OR THAT YOU ARE A FAILURE OR HAVE LET YOURSELF OR YOUR FAMILY DOWN: 0
5. POOR APPETITE OR OVEREATING: 0

## 2023-10-19 ASSESSMENT — ENCOUNTER SYMPTOMS
BACK PAIN: 1
SHORTNESS OF BREATH: 0
COUGH: 0
RHINORRHEA: 0
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
## 2023-11-06 ENCOUNTER — HOSPITAL ENCOUNTER (OUTPATIENT)
Dept: PHYSICAL THERAPY | Age: 33
Setting detail: THERAPIES SERIES
Discharge: HOME | End: 2023-11-06

## 2023-11-06 NOTE — FLOWSHEET NOTE
Physical Therapy  Cancellation/No-show Note  Patient Name:  Raymond Castro  :  1990   Date:  2023  Cancels to Date: 0  No-shows to Date: 1    For today's appointment patient:  []  Cancelled  []  Rescheduled appointment  [x]  No-show     Reason given by patient:  []  Patient ill  []  Conflicting appointment  []  No transportation    []  Conflict with work  []  No reason given  []  Other:     Comments:      Electronically signed by:  Jesus Issa, [address] [place] [address] [address]

## 2023-11-22 ENCOUNTER — OFFICE VISIT (OUTPATIENT)
Dept: ORTHOPEDIC SURGERY | Age: 33
End: 2023-11-22
Payer: COMMERCIAL

## 2023-11-22 VITALS — HEIGHT: 76 IN | BODY MASS INDEX: 31.63 KG/M2 | WEIGHT: 259.7 LBS

## 2023-11-22 DIAGNOSIS — M54.50 LUMBAR PAIN: Primary | ICD-10-CM

## 2023-11-22 DIAGNOSIS — M51.26 LUMBAR DISC HERNIATION: ICD-10-CM

## 2023-11-22 PROCEDURE — 99213 OFFICE O/P EST LOW 20 MIN: CPT | Performed by: ORTHOPAEDIC SURGERY

## 2023-11-22 NOTE — PROGRESS NOTES
Examinations:   RIGHT LOWER EXTREMITY: Inspection/examination of the right lower extremity does not show any tenderness, deformity or injury. Range of motion is unremarkable. There is no gross instability. There are no rashes, ulcerations or lesions. Strength and tone are normal.    LEFT LOWER EXTREMITY:  Inspection/examination of the left lower extremity does not show any tenderness, deformity or injury. Range of motion is unremarkable. There is no gross instability. There are no rashes, ulcerations or lesions. Strength and tone are normal.    Diagnostic Testing:    I reviewed AP and lateral x-rays of the lumbar spine that were obtained in the office today. Those show mild spondylosis    I reviewed CT images of the abdomen from 1/25/2018 in the office today. Those show spondylosis    I reviewed AP and lateral x-rays lumbar spine from 3/14/2012 in the office today. Those show spondylosis    Impression:   Lumbar disc herniation    Plan:    Discussed treatment options including observation, physical therapy, epidural injection, and additional imaging. He would like to proceed with physical therapy.   He may call to schedule lumbar MRI if his symptoms persist after that

## 2023-12-28 DIAGNOSIS — B18.2 HEPATITIS C CARRIER (HCC): ICD-10-CM

## 2023-12-28 DIAGNOSIS — Z00.00 ENCOUNTER FOR WELL ADULT EXAM WITHOUT ABNORMAL FINDINGS: ICD-10-CM

## 2023-12-28 DIAGNOSIS — Z72.51 HISTORY OF UNPROTECTED SEX: ICD-10-CM

## 2023-12-28 DIAGNOSIS — F19.11 HISTORY OF INTRAVENOUS DRUG ABUSE (HCC): ICD-10-CM

## 2023-12-29 LAB
HAV IGM SERPL QL IA: ABNORMAL
HBV CORE IGM SERPL QL IA: ABNORMAL
HBV SURFACE AG SERPL QL IA: ABNORMAL
HCV AB S/CO SERPL IA: 10.31 IV
HCV AB SERPL QL IA: ABNORMAL
HCV AB SERPL QL IA: REACTIVE
HIV 1+2 AB+HIV1 P24 AG SERPL QL IA: NORMAL
HIV 2 AB SERPL QL IA: NORMAL
HIV1 AB SERPL QL IA: NORMAL
REAGIN+T PALLIDUM IGG+IGM SERPL-IMP: NORMAL

## 2023-12-30 LAB
HCV RNA SERPL NAA+PROBE-ACNC: NOT DETECTED IU/ML
HCV RNA SERPL NAA+PROBE-LOG IU: NOT DETECTED LOG IU/ML
HCV RNA SERPL QL NAA+PROBE: NOT DETECTED

## 2024-01-05 ENCOUNTER — APPOINTMENT (OUTPATIENT)
Dept: GENERAL RADIOLOGY | Age: 34
End: 2024-01-05
Payer: COMMERCIAL

## 2024-01-05 ENCOUNTER — HOSPITAL ENCOUNTER (EMERGENCY)
Age: 34
Discharge: HOME | End: 2024-01-05
Payer: COMMERCIAL

## 2024-01-05 VITALS
TEMPERATURE: 98.2 F | OXYGEN SATURATION: 99 % | WEIGHT: 275 LBS | BODY MASS INDEX: 33.47 KG/M2 | RESPIRATION RATE: 18 BRPM | HEART RATE: 84 BPM | DIASTOLIC BLOOD PRESSURE: 89 MMHG | SYSTOLIC BLOOD PRESSURE: 148 MMHG

## 2024-01-05 DIAGNOSIS — J20.9 BRONCHITIS, ACUTE, WITH BRONCHOSPASM: Primary | ICD-10-CM

## 2024-01-05 DIAGNOSIS — R05.1 ACUTE COUGH: ICD-10-CM

## 2024-01-05 DIAGNOSIS — R03.0 ELEVATED BLOOD PRESSURE READING: ICD-10-CM

## 2024-01-05 DIAGNOSIS — Z72.0 TOBACCO USE: ICD-10-CM

## 2024-01-05 LAB
FLUAV RNA RESP QL NAA+PROBE: NOT DETECTED
FLUBV RNA RESP QL NAA+PROBE: NOT DETECTED
SARS-COV-2 RNA RESP QL NAA+PROBE: NOT DETECTED

## 2024-01-05 PROCEDURE — 6370000000 HC RX 637 (ALT 250 FOR IP): Performed by: PHYSICIAN ASSISTANT

## 2024-01-05 PROCEDURE — 99284 EMERGENCY DEPT VISIT MOD MDM: CPT

## 2024-01-05 PROCEDURE — 71046 X-RAY EXAM CHEST 2 VIEWS: CPT

## 2024-01-05 PROCEDURE — 87636 SARSCOV2 & INF A&B AMP PRB: CPT

## 2024-01-05 RX ORDER — AZITHROMYCIN 250 MG/1
TABLET, FILM COATED ORAL
Qty: 1 PACKET | Refills: 0 | Status: SHIPPED | OUTPATIENT
Start: 2024-01-05

## 2024-01-05 RX ORDER — PREDNISONE 20 MG/1
20 TABLET ORAL 2 TIMES DAILY
Qty: 10 TABLET | Refills: 0 | Status: SHIPPED | OUTPATIENT
Start: 2024-01-05 | End: 2024-01-10

## 2024-01-05 RX ORDER — IPRATROPIUM BROMIDE AND ALBUTEROL SULFATE 2.5; .5 MG/3ML; MG/3ML
1 SOLUTION RESPIRATORY (INHALATION) ONCE
Status: COMPLETED | OUTPATIENT
Start: 2024-01-05 | End: 2024-01-05

## 2024-01-05 RX ORDER — PREDNISONE 20 MG/1
40 TABLET ORAL ONCE
Status: COMPLETED | OUTPATIENT
Start: 2024-01-05 | End: 2024-01-05

## 2024-01-05 RX ORDER — ALBUTEROL SULFATE 90 UG/1
2 AEROSOL, METERED RESPIRATORY (INHALATION) EVERY 6 HOURS PRN
Qty: 18 G | Refills: 0 | Status: SHIPPED | OUTPATIENT
Start: 2024-01-05

## 2024-01-05 RX ADMIN — PREDNISONE 40 MG: 20 TABLET ORAL at 14:26

## 2024-01-05 RX ADMIN — IPRATROPIUM BROMIDE AND ALBUTEROL SULFATE 1 DOSE: 2.5; .5 SOLUTION RESPIRATORY (INHALATION) at 14:26

## 2024-01-05 ASSESSMENT — PAIN - FUNCTIONAL ASSESSMENT: PAIN_FUNCTIONAL_ASSESSMENT: NONE - DENIES PAIN

## 2024-01-05 NOTE — ED PROVIDER NOTES
[facility] ED  EMERGENCY DEPARTMENT ENCOUNTER        Pt Name: Jose Castro  MRN: [id number]  Birthdate 1990  Date of evaluation: 2024  Provider: Laura Luke PA-C  PCP: Bartolome Valiente DO  Note Started: 2:50 PM EST 24      NICOLASA. I have evaluated this patient.        CHIEF COMPLAINT       Chief Complaint   Patient presents with    Shortness of Breath     SOB and cough x2 months. Denies seeing PCP.        HISTORY OF PRESENT ILLNESS: 1 or more Elements     History From: Patient  Limitations to history : None    Jose Castro is a 33 y.o. male who presents to the emergency department for evaluation of cough and shortness of breath has had symptoms for 2 months.  He is a smoker.  No chest pain.  No known fever.    Nursing Notes were all reviewed and agreed with or any disagreements were addressed in the HPI.    REVIEW OF SYSTEMS :      Review of Systems    Positives and Pertinent negatives as per HPI.     SURGICAL HISTORY     Past Surgical History:   Procedure Laterality Date    CHOLECYSTECTOMY      2016    WISDOM TOOTH EXTRACTION      2006       CURRENTMEDICATIONS       Previous Medications    No medications on file       ALLERGIES     Bactrim [sulfamethoxazole-trimethoprim]    FAMILYHISTORY       Family History   Problem Relation Age of Onset    Heart Disease Mother     Heart Attack Father 55    Other Maternal Grandmother         Multiple chronic medical issues    COPD Maternal Grandmother     No Known Problems Maternal Grandfather     Heart Failure Paternal Grandmother     Colon Cancer Paternal Grandfather 60        SOCIAL HISTORY       Social History     Tobacco Use    Smoking status: Former     Types: Cigarettes, E-Cigarettes     Quit date:      Years since quittin.0    Smokeless tobacco: Never    Tobacco comments:     Vaping, cartridge lasting 3 days   Vaping Use    Vaping Use: Every day    Substances: Nicotine    Passive vaping exposure: Yes  history of IV drug abuse (HCC) and Seizures (HCC).    CONSULTS: (Who and What was discussed)  None        Records Reviewed (External and Source)     CC/HPI Summary, DDx, ED Course, and Reassessment:         Patient presented to the ER for evaluation of cough and shortness of breath for 2 months.  On exam he has bilateral expiratory wheezes and decreased breath sounds scattered rhonchi concern for possible pneumonia versus bronchitis.  He is a smoker.  Was given a breathing treatment and steroids here we will obtain chest x-ray.  Patient is stable.  Family at bedside.    Differential diagnosis includes but is not limited to pneumonia, bronchitis, COVID-19, influenza, viral respiratory illness, COPD exacerbation      COVID-19 and flu swabs are negative      Chest x-ray interpreted by radiologist and reviewed by myself no definite pneumonia seen      Patient reevaluated.  States he is feeling better after breathing treatment here lung sounds have improved he still has a few faint wheezes but overall better aeration good air exchange.  No respiratory distress.  Discussed results with him no definite pneumonia seen on the x-ray.  Suspect bronchitis viral versus bacterial cause concern for atypical bacterial infection and plan to cover with [facility]-Toby due to his prolonged symptoms.  Patient in agreement.  He was also placed on burst of prednisone and albuterol inhaler as needed.  Smoking cessation.  To follow-up with his doctor next week.  Return to the ER for worsening symptoms specifically chest pain or difficulty breathing.      I estimate there is LOW risk for ACUTE RESPIRATORY FAILURE, STATUS ASTHMATICUS, MENINGITIS, PERITONSILLAR ABSCESS, SEPSIS,  thus I consider the discharge disposition reasonable.         I am the Primary Clinician of Record.  FINAL IMPRESSION      1. Bronchitis, acute, with bronchospasm    2. Tobacco use    3. Acute cough    4. Elevated blood pressure reading          DISPOSITION/PLAN     DISPOSITION

## 2024-01-05 NOTE — DISCHARGE INSTRUCTIONS
Recommend you follow-up with your doctor early next week for reevaluation.  Return to the ER for worsening symptoms specifically for chest pain or difficulty breathing.      Your blood pressure is elevated and will need to be monitored.

## 2024-04-06 ENCOUNTER — HOSPITAL ENCOUNTER (EMERGENCY)
Age: 34
Discharge: LEFT BEFORE DISCHARGE | End: 2024-04-06
Attending: STUDENT IN AN ORGANIZED HEALTH CARE EDUCATION/TRAINING PROGRAM
Payer: COMMERCIAL

## 2024-04-06 ENCOUNTER — APPOINTMENT (OUTPATIENT)
Dept: GENERAL RADIOLOGY | Age: 34
End: 2024-04-06
Payer: COMMERCIAL

## 2024-04-06 VITALS
WEIGHT: 280 LBS | TEMPERATURE: 98.7 F | RESPIRATION RATE: 18 BRPM | DIASTOLIC BLOOD PRESSURE: 91 MMHG | HEIGHT: 74 IN | HEART RATE: 83 BPM | SYSTOLIC BLOOD PRESSURE: 132 MMHG | OXYGEN SATURATION: 94 % | BODY MASS INDEX: 35.94 KG/M2

## 2024-04-06 DIAGNOSIS — Z53.29 LEFT AGAINST MEDICAL ADVICE: ICD-10-CM

## 2024-04-06 DIAGNOSIS — R07.9 CHEST PAIN, UNSPECIFIED TYPE: Primary | ICD-10-CM

## 2024-04-06 LAB
ALBUMIN SERPL-MCNC: 4.4 G/DL (ref 3.4–5)
ALBUMIN/GLOB SERPL: 1.6 {RATIO} (ref 1.1–2.2)
ALP SERPL-CCNC: 88 U/L (ref 40–129)
ALT SERPL-CCNC: 34 U/L (ref 10–40)
ANION GAP SERPL CALCULATED.3IONS-SCNC: 10 MMOL/L (ref 3–16)
AST SERPL-CCNC: 23 U/L (ref 15–37)
BASOPHILS # BLD: 0 K/UL (ref 0–0.2)
BASOPHILS NFR BLD: 0.5 %
BILIRUB SERPL-MCNC: 0.5 MG/DL (ref 0–1)
BUN SERPL-MCNC: 10 MG/DL (ref 7–20)
CALCIUM SERPL-MCNC: 9.2 MG/DL (ref 8.3–10.6)
CHLORIDE SERPL-SCNC: 101 MMOL/L (ref 99–110)
CO2 SERPL-SCNC: 27 MMOL/L (ref 21–32)
CREAT SERPL-MCNC: 0.9 MG/DL (ref 0.9–1.3)
DEPRECATED RDW RBC AUTO: 15.4 % (ref 12.4–15.4)
EKG ATRIAL RATE: 90 BPM
EKG DIAGNOSIS: NORMAL
EKG P AXIS: 74 DEGREES
EKG P-R INTERVAL: 156 MS
EKG Q-T INTERVAL: 382 MS
EKG QRS DURATION: 82 MS
EKG QTC CALCULATION (BAZETT): 467 MS
EKG R AXIS: 75 DEGREES
EKG T AXIS: 42 DEGREES
EKG VENTRICULAR RATE: 90 BPM
EOSINOPHIL # BLD: 0.1 K/UL (ref 0–0.6)
EOSINOPHIL NFR BLD: 1.1 %
GFR SERPLBLD CREATININE-BSD FMLA CKD-EPI: >90 ML/MIN/{1.73_M2}
GLUCOSE SERPL-MCNC: 131 MG/DL (ref 70–99)
HCT VFR BLD AUTO: 47 % (ref 40.5–52.5)
HGB BLD-MCNC: 15.4 G/DL (ref 13.5–17.5)
LYMPHOCYTES # BLD: 1.2 K/UL (ref 1–5.1)
LYMPHOCYTES NFR BLD: 14 %
MCH RBC QN AUTO: 28.1 PG (ref 26–34)
MCHC RBC AUTO-ENTMCNC: 32.7 G/DL (ref 31–36)
MCV RBC AUTO: 86 FL (ref 80–100)
MONOCYTES # BLD: 0.4 K/UL (ref 0–1.3)
MONOCYTES NFR BLD: 4.2 %
NEUTROPHILS # BLD: 7.2 K/UL (ref 1.7–7.7)
NEUTROPHILS NFR BLD: 80.2 %
NT-PROBNP SERPL-MCNC: <36 PG/ML (ref 0–124)
PLATELET # BLD AUTO: 219 K/UL (ref 135–450)
PMV BLD AUTO: 7.6 FL (ref 5–10.5)
POTASSIUM SERPL-SCNC: 4.2 MMOL/L (ref 3.5–5.1)
PROT SERPL-MCNC: 7.2 G/DL (ref 6.4–8.2)
RBC # BLD AUTO: 5.47 M/UL (ref 4.2–5.9)
SODIUM SERPL-SCNC: 138 MMOL/L (ref 136–145)
TROPONIN, HIGH SENSITIVITY: 6 NG/L (ref 0–22)
TROPONIN, HIGH SENSITIVITY: 7 NG/L (ref 0–22)
WBC # BLD AUTO: 8.9 K/UL (ref 4–11)

## 2024-04-06 PROCEDURE — 80053 COMPREHEN METABOLIC PANEL: CPT

## 2024-04-06 PROCEDURE — 84484 ASSAY OF TROPONIN QUANT: CPT

## 2024-04-06 PROCEDURE — 93010 ELECTROCARDIOGRAM REPORT: CPT | Performed by: INTERNAL MEDICINE

## 2024-04-06 PROCEDURE — 85025 COMPLETE CBC W/AUTO DIFF WBC: CPT

## 2024-04-06 PROCEDURE — 71046 X-RAY EXAM CHEST 2 VIEWS: CPT

## 2024-04-06 PROCEDURE — 83880 ASSAY OF NATRIURETIC PEPTIDE: CPT

## 2024-04-06 PROCEDURE — 93005 ELECTROCARDIOGRAM TRACING: CPT | Performed by: STUDENT IN AN ORGANIZED HEALTH CARE EDUCATION/TRAINING PROGRAM

## 2024-04-06 PROCEDURE — 99285 EMERGENCY DEPT VISIT HI MDM: CPT

## 2024-04-06 PROCEDURE — 6370000000 HC RX 637 (ALT 250 FOR IP): Performed by: REGISTERED NURSE

## 2024-04-06 RX ORDER — NITROGLYCERIN 0.4 MG/1
0.4 TABLET SUBLINGUAL EVERY 5 MIN PRN
Status: DISCONTINUED | OUTPATIENT
Start: 2024-04-06 | End: 2024-04-06 | Stop reason: HOSPADM

## 2024-04-06 RX ORDER — ASPIRIN 81 MG/1
324 TABLET, CHEWABLE ORAL ONCE
Status: COMPLETED | OUTPATIENT
Start: 2024-04-06 | End: 2024-04-06

## 2024-04-06 RX ADMIN — ASPIRIN 324 MG: 81 TABLET, CHEWABLE ORAL at 12:58

## 2024-04-06 RX ADMIN — NITROGLYCERIN 0.4 MG: 0.4 TABLET, ORALLY DISINTEGRATING SUBLINGUAL at 12:58

## 2024-04-06 ASSESSMENT — PAIN - FUNCTIONAL ASSESSMENT: PAIN_FUNCTIONAL_ASSESSMENT: 0-10

## 2024-04-06 ASSESSMENT — ENCOUNTER SYMPTOMS
CHEST TIGHTNESS: 1
SHORTNESS OF BREATH: 0

## 2024-04-06 ASSESSMENT — LIFESTYLE VARIABLES
HOW MANY STANDARD DRINKS CONTAINING ALCOHOL DO YOU HAVE ON A TYPICAL DAY: 1 OR 2
HOW OFTEN DO YOU HAVE A DRINK CONTAINING ALCOHOL: MONTHLY OR LESS

## 2024-04-06 ASSESSMENT — PAIN SCALES - GENERAL: PAINLEVEL_OUTOF10: 7

## 2024-04-06 ASSESSMENT — PAIN DESCRIPTION - LOCATION: LOCATION: CHEST

## 2024-04-06 ASSESSMENT — HEART SCORE: ECG: NORMAL

## 2024-04-06 ASSESSMENT — PAIN DESCRIPTION - PAIN TYPE: TYPE: ACUTE PAIN

## 2024-04-06 NOTE — ED PROVIDER NOTES
[facility]  ED  EMERGENCY DEPARTMENT ENCOUNTER        Pt Name: Jose Castro  MRN: [id number]  Birthdate 1990  Date of evaluation: 4/6/2024  Provider: REBEKA Morrison - CNP  PCP: Bartolome Valiente DO    This patient was seen and evaluated by the attending physician Sheba Pino MD.    I have evaluated this patient. My supervising physician was available for consultation.      CHIEF COMPLAINT       Chief Complaint   Patient presents with    Chest Pain     For 2 weeks       HISTORY OF PRESENT ILLNESS   (Location/Symptom, Timing/Onset, Context/Setting, Quality, Duration, Modifying Factors, Severity)  Note limiting factors.     History from : Patient  Jose Castro is a 34 y.o. male who presents via private car for chest pain. Onset was 2 weeks ago. Duration has been intermittent since the onset. Context includes patient presents to the emergency department today stating that he developed chest pain approximately 2 weeks ago that has been intermittent.  He states this morning his chest pain began to get worse and began radiating to his left shoulder and back.  He states he did have some associated shortness of breath with this pain as well as some associated sweating.  He states he has a significant family history of coronary artery disease and heart attacks.  He does have active chest pain that he describes as \"stabbing and heaviness\".  He denies palpitations or swelling his extremities.  He denies shortness of breath, cough congestion or fever.  He denies abdominal pain or urinary symptoms. Quality is sharp, heaviness this is with radiation to his left shoulder and back. Alleviating factors include nothing. Aggravating factors include nothing. Pain is 7/10. Nothing has been used for pain today.       Chart review reveals pt has significant PMHx of history of IV drug use, seizures. They take Suboxone.     Nursing Notes were all reviewed and agreed with or any  medical advice          DISPOSITION/PLAN   DISPOSITION [place] 04/06/2024 02:19:11 PM      PATIENT REFERREDTO:  Bartolome Valiente DO  [address] [id number]  [phone]    In 2 days  Re-evaluation    [facility]  ED  [address]  [facility] [address] [phone]    As needed, If symptoms worsen    Isac Keyes MD  [address]  [address]  [facility] [id number] [phone]            DISCHARGE MEDICATIONS:  Discharge Medication List as of 4/6/2024  2:22 PM          DISCONTINUED MEDICATIONS:  Discharge Medication List as of 4/6/2024  2:22 PM                 (Please note that portions ofthis note were completed with a voice recognition program.  Efforts were made to edit the dictations but occasionally words are mis-transcribed.)    REBEKA Morrison - CNP (electronically signed)       Renee Jhaveri APRN - GAYATRI  04/06/24 1426       Renee Jhaveri APRN - GAYATRI  04/06/24 1239

## 2024-06-19 ENCOUNTER — APPOINTMENT (OUTPATIENT)
Dept: GENERAL RADIOLOGY | Age: 34
End: 2024-06-19
Payer: COMMERCIAL

## 2024-06-19 ENCOUNTER — HOSPITAL ENCOUNTER (EMERGENCY)
Age: 34
Discharge: LEFT BEFORE DISCHARGE | End: 2024-06-19
Payer: COMMERCIAL

## 2024-06-19 VITALS
DIASTOLIC BLOOD PRESSURE: 99 MMHG | RESPIRATION RATE: 14 BRPM | OXYGEN SATURATION: 95 % | TEMPERATURE: 98.1 F | BODY MASS INDEX: 31.08 KG/M2 | WEIGHT: 250 LBS | HEIGHT: 75 IN | SYSTOLIC BLOOD PRESSURE: 157 MMHG | HEART RATE: 102 BPM

## 2024-06-19 DIAGNOSIS — T73.2XXA FATIGUE DUE TO EXPOSURE, INITIAL ENCOUNTER: Primary | ICD-10-CM

## 2024-06-19 PROCEDURE — 99283 EMERGENCY DEPT VISIT LOW MDM: CPT

## 2024-06-19 PROCEDURE — 71046 X-RAY EXAM CHEST 2 VIEWS: CPT

## 2024-06-19 RX ORDER — 0.9 % SODIUM CHLORIDE 0.9 %
1000 INTRAVENOUS SOLUTION INTRAVENOUS ONCE
Status: DISCONTINUED | OUTPATIENT
Start: 2024-06-19 | End: 2024-06-19 | Stop reason: HOSPADM

## 2024-06-19 ASSESSMENT — LIFESTYLE VARIABLES: HOW OFTEN DO YOU HAVE A DRINK CONTAINING ALCOHOL: NEVER

## 2024-06-19 ASSESSMENT — PAIN - FUNCTIONAL ASSESSMENT: PAIN_FUNCTIONAL_ASSESSMENT: 0-10

## 2024-06-19 ASSESSMENT — PAIN SCALES - GENERAL: PAINLEVEL_OUTOF10: 0

## 2024-06-19 NOTE — ED NOTES
Entered room to obtain blood work.  Patient not present.  Looked outside ER entrance.  Unable to locate patient

## 2024-06-19 NOTE — ED PROVIDER NOTES
[facility]  ED  EMERGENCY DEPARTMENT ENCOUNTER        Pt Name: Jose Castro  MRN: [id number]  Birthdate 1990  Date of evaluation: 6/19/2024  Provider: KETURAH Johnson  PCP: Bartolome Valiente DO  Note Started: 5:12 PM EDT 6/19/24      NICOLASA. I have evaluated this patient.        CHIEF COMPLAINT       Chief Complaint   Patient presents with    Fatigue     Patient reports he works outside, felt weak and dizzy today and felt like he was going to pass out       HISTORY OF PRESENT ILLNESS: 1 or more Elements     History from : Patient    Limitations to history : None    Jose Castro is a 34 y.o. male who presents to the emergency department complaining of fatigue and dizziness.  Patient states he works outside and today while at work he became lightheaded feeling like he might pass out.  Reports he did eat earlier today but has not been drinking adequate amount of water.  Thinks he may be dehydrated.  He denies any chest pain or shortness of breath.  No abdominal pain, nausea or vomiting.  Denies any dysuria or hematuria.  He denies any medical conditions.  Does not take any medications.  He does have family cardiac history.  He reports he smokes about a pack per day and while at work will vape.    Nursing Notes were all reviewed and agreed with or any disagreements were addressed in the HPI.    REVIEW OF SYSTEMS :      Review of Systems    Positives and Pertinent negatives as per HPI.     SURGICAL HISTORY     Past Surgical History:   Procedure Laterality Date    CHOLECYSTECTOMY      2016    WISDOM TOOTH EXTRACTION      2006       CURRENTMEDICATIONS       Discharge Medication List as of 6/19/2024  5:12 PM        CONTINUE these medications which have NOT CHANGED    Details   azithromycin (ZITHROMAX) 250 MG tablet 2 po day 1, then 1 po days 2-5, Disp-1 packet, R-0Normal      albuterol sulfate HFA (PROVENTIL HFA) 108 (90 Base) MCG/ACT inhaler Inhale 2 puffs into the lungs every 6

## 2024-09-05 ENCOUNTER — HOSPITAL ENCOUNTER (EMERGENCY)
Age: 34
Discharge: HOME | End: 2024-09-05
Payer: COMMERCIAL

## 2024-09-05 ENCOUNTER — APPOINTMENT (OUTPATIENT)
Dept: GENERAL RADIOLOGY | Age: 34
End: 2024-09-05
Payer: COMMERCIAL

## 2024-09-05 VITALS
HEIGHT: 75 IN | DIASTOLIC BLOOD PRESSURE: 83 MMHG | OXYGEN SATURATION: 97 % | RESPIRATION RATE: 18 BRPM | TEMPERATURE: 98.4 F | WEIGHT: 270 LBS | HEART RATE: 84 BPM | SYSTOLIC BLOOD PRESSURE: 156 MMHG | BODY MASS INDEX: 33.57 KG/M2

## 2024-09-05 DIAGNOSIS — R07.9 CHEST PAIN, UNSPECIFIED TYPE: Primary | ICD-10-CM

## 2024-09-05 LAB
ALBUMIN SERPL-MCNC: 4.5 G/DL (ref 3.4–5)
ALBUMIN/GLOB SERPL: 1.6 {RATIO} (ref 1.1–2.2)
ALP SERPL-CCNC: 92 U/L (ref 40–129)
ALT SERPL-CCNC: 23 U/L (ref 10–40)
ANION GAP SERPL CALCULATED.3IONS-SCNC: 12 MMOL/L (ref 3–16)
AST SERPL-CCNC: 17 U/L (ref 15–37)
BASOPHILS # BLD: 0.1 K/UL (ref 0–0.2)
BASOPHILS NFR BLD: 0.6 %
BILIRUB SERPL-MCNC: <0.2 MG/DL (ref 0–1)
BUN SERPL-MCNC: 14 MG/DL (ref 7–20)
CALCIUM SERPL-MCNC: 9.9 MG/DL (ref 8.3–10.6)
CHLORIDE SERPL-SCNC: 105 MMOL/L (ref 99–110)
CO2 SERPL-SCNC: 25 MMOL/L (ref 21–32)
CREAT SERPL-MCNC: 1 MG/DL (ref 0.9–1.3)
DEPRECATED RDW RBC AUTO: 14.3 % (ref 12.4–15.4)
EOSINOPHIL # BLD: 0.2 K/UL (ref 0–0.6)
EOSINOPHIL NFR BLD: 1.9 %
GFR SERPLBLD CREATININE-BSD FMLA CKD-EPI: >90 ML/MIN/{1.73_M2}
GLUCOSE SERPL-MCNC: 93 MG/DL (ref 70–99)
HCT VFR BLD AUTO: 43 % (ref 40.5–52.5)
HGB BLD-MCNC: 14.3 G/DL (ref 13.5–17.5)
LYMPHOCYTES # BLD: 2.1 K/UL (ref 1–5.1)
LYMPHOCYTES NFR BLD: 24.8 %
MCH RBC QN AUTO: 29.1 PG (ref 26–34)
MCHC RBC AUTO-ENTMCNC: 33.2 G/DL (ref 31–36)
MCV RBC AUTO: 87.7 FL (ref 80–100)
MONOCYTES # BLD: 0.6 K/UL (ref 0–1.3)
MONOCYTES NFR BLD: 7.1 %
NEUTROPHILS # BLD: 5.6 K/UL (ref 1.7–7.7)
NEUTROPHILS NFR BLD: 65.6 %
PLATELET # BLD AUTO: 211 K/UL (ref 135–450)
PMV BLD AUTO: 7.5 FL (ref 5–10.5)
POTASSIUM SERPL-SCNC: 4.1 MMOL/L (ref 3.5–5.1)
PROT SERPL-MCNC: 7.3 G/DL (ref 6.4–8.2)
RBC # BLD AUTO: 4.91 M/UL (ref 4.2–5.9)
SODIUM SERPL-SCNC: 142 MMOL/L (ref 136–145)
TROPONIN, HIGH SENSITIVITY: 10 NG/L (ref 0–22)
TROPONIN, HIGH SENSITIVITY: 7 NG/L (ref 0–22)
WBC # BLD AUTO: 8.6 K/UL (ref 4–11)

## 2024-09-05 PROCEDURE — 93005 ELECTROCARDIOGRAM TRACING: CPT | Performed by: EMERGENCY MEDICINE

## 2024-09-05 PROCEDURE — 85025 COMPLETE CBC W/AUTO DIFF WBC: CPT

## 2024-09-05 PROCEDURE — 84484 ASSAY OF TROPONIN QUANT: CPT

## 2024-09-05 PROCEDURE — 80053 COMPREHEN METABOLIC PANEL: CPT

## 2024-09-05 PROCEDURE — 99285 EMERGENCY DEPT VISIT HI MDM: CPT

## 2024-09-05 PROCEDURE — 71045 X-RAY EXAM CHEST 1 VIEW: CPT

## 2024-09-05 ASSESSMENT — LIFESTYLE VARIABLES
HOW MANY STANDARD DRINKS CONTAINING ALCOHOL DO YOU HAVE ON A TYPICAL DAY: PATIENT DOES NOT DRINK
HOW OFTEN DO YOU HAVE A DRINK CONTAINING ALCOHOL: NEVER

## 2024-09-05 ASSESSMENT — PAIN SCALES - GENERAL: PAINLEVEL_OUTOF10: 6

## 2024-09-05 ASSESSMENT — PAIN - FUNCTIONAL ASSESSMENT: PAIN_FUNCTIONAL_ASSESSMENT: 0-10

## 2024-09-06 LAB
EKG ATRIAL RATE: 84 BPM
EKG DIAGNOSIS: NORMAL
EKG P AXIS: 56 DEGREES
EKG P-R INTERVAL: 182 MS
EKG Q-T INTERVAL: 354 MS
EKG QRS DURATION: 78 MS
EKG QTC CALCULATION (BAZETT): 418 MS
EKG R AXIS: 43 DEGREES
EKG T AXIS: 26 DEGREES
EKG VENTRICULAR RATE: 84 BPM

## 2024-09-06 PROCEDURE — 93010 ELECTROCARDIOGRAM REPORT: CPT | Performed by: INTERNAL MEDICINE

## 2024-09-06 NOTE — ED PROVIDER NOTES
[facility]  ED  EMERGENCY DEPARTMENT ENCOUNTER        Pt Name: Jose Castro  MRN: [id number]  Birthdate 1990  Date of evaluation: 9/5/2024  Provider: REBEKA Barajas - GAYATRI  PCP: Bartolome Valiente,         NICOLASA. I have evaluated this patient.        CHIEF COMPLAINT       Chief Complaint   Patient presents with    Chest Pain     Pt reports he has been having chest pain and shortness of breath all day, reports it radiates to his left arm, and left jaw. Pt also states his family has a cardiac Hx       HISTORY OF PRESENT ILLNESS: 1 or more Elements     History From: the patient  Limitations to history : None    Jose Castro is a 34 y.o. male who presents to the ER today with complaints of chest pain and difficulty breathing, patient states that is been going on all day, states that its radiates into his left arm and left jaw, states that he does have a family history of cardiac disease.  He has been evaluated in the ED for this problem in the past, has not seen cardiology as an outpatient.    Nursing Notes were all reviewed and agreed with or any disagreements were addressed in the HPI.    REVIEW OF SYSTEMS :      Review of Systems    Positives and Pertinent negatives as per HPI.     SURGICAL HISTORY     Past Surgical History:   Procedure Laterality Date    CHOLECYSTECTOMY      2016    WISDOM TOOTH EXTRACTION      2006       CURRENTMEDICATIONS       Discharge Medication List as of 9/5/2024 11:52 PM        CONTINUE these medications which have NOT CHANGED    Details   azithromycin (ZITHROMAX) 250 MG tablet 2 po day 1, then 1 po days 2-5, Disp-1 packet, R-0Normal      albuterol sulfate HFA (PROVENTIL HFA) 108 (90 Base) MCG/ACT inhaler Inhale 2 puffs into the lungs every 6 hours as needed for Wheezing or Shortness of Breath, Disp-18 g, R-0Normal             ALLERGIES     Bactrim [sulfamethoxazole-trimethoprim]    FAMILYHISTORY       Family History   Problem Relation Age of Onset

## 2024-09-06 NOTE — ED PROVIDER NOTES
I was available for consultation during patient's ED stay.  Patient was cared for by NICOLASA.  I did not evaluate or participate in patient care.    EKG Interpretation    Interpreted by emergency department physician    Rhythm: normal sinus   Rate: normal  Axis: normal  Ectopy: none  Conduction: normal  ST Segments: normal  T Waves: normal  Q Waves: none    Clinical Impression: Normal sinus rhythm, no significant T wave or ST changes.  Normal VA interval, normal QRS duration, normal QT QTC.  Normal axis.  No arrhythmia or signs of ischemia.  Otherwise normal EKG. Interpreted by myself.          MD Dangelo Lipscomb Stephen W, MD  09/05/24 1945

## 2024-09-27 DIAGNOSIS — Z87.898 HX OF INTRAVENOUS DRUG USE IN REMISSION: ICD-10-CM

## 2024-09-27 DIAGNOSIS — Z76.89 ENCOUNTER TO ESTABLISH CARE: ICD-10-CM

## 2024-09-27 LAB
ALBUMIN SERPL-MCNC: 4.2 G/DL (ref 3.4–5)
ALBUMIN/GLOB SERPL: 1.8 {RATIO} (ref 1.1–2.2)
ALP SERPL-CCNC: 87 U/L (ref 40–129)
ALT SERPL-CCNC: 31 U/L (ref 10–40)
ANION GAP SERPL CALCULATED.3IONS-SCNC: 9 MMOL/L (ref 3–16)
AST SERPL-CCNC: 30 U/L (ref 15–37)
BASOPHILS # BLD: 0.1 K/UL (ref 0–0.2)
BASOPHILS NFR BLD: 1.3 %
BILIRUB SERPL-MCNC: 0.3 MG/DL (ref 0–1)
BUN SERPL-MCNC: 9 MG/DL (ref 7–20)
CALCIUM SERPL-MCNC: 9.1 MG/DL (ref 8.3–10.6)
CHLORIDE SERPL-SCNC: 103 MMOL/L (ref 99–110)
CO2 SERPL-SCNC: 25 MMOL/L (ref 21–32)
CREAT SERPL-MCNC: 0.8 MG/DL (ref 0.9–1.3)
DEPRECATED RDW RBC AUTO: 14 % (ref 12.4–15.4)
EOSINOPHIL # BLD: 0.2 K/UL (ref 0–0.6)
EOSINOPHIL NFR BLD: 3.8 %
GFR SERPLBLD CREATININE-BSD FMLA CKD-EPI: >90 ML/MIN/{1.73_M2}
GLUCOSE SERPL-MCNC: 112 MG/DL (ref 70–99)
HCT VFR BLD AUTO: 42.3 % (ref 40.5–52.5)
HGB BLD-MCNC: 14.3 G/DL (ref 13.5–17.5)
LYMPHOCYTES # BLD: 1.7 K/UL (ref 1–5.1)
LYMPHOCYTES NFR BLD: 29 %
MCH RBC QN AUTO: 29 PG (ref 26–34)
MCHC RBC AUTO-ENTMCNC: 33.9 G/DL (ref 31–36)
MCV RBC AUTO: 85.6 FL (ref 80–100)
MONOCYTES # BLD: 0.4 K/UL (ref 0–1.3)
MONOCYTES NFR BLD: 6.3 %
NEUTROPHILS # BLD: 3.5 K/UL (ref 1.7–7.7)
NEUTROPHILS NFR BLD: 59.6 %
PLATELET # BLD AUTO: 201 K/UL (ref 135–450)
PMV BLD AUTO: 8.7 FL (ref 5–10.5)
POTASSIUM SERPL-SCNC: 4 MMOL/L (ref 3.5–5.1)
PROT SERPL-MCNC: 6.5 G/DL (ref 6.4–8.2)
RBC # BLD AUTO: 4.94 M/UL (ref 4.2–5.9)
SODIUM SERPL-SCNC: 137 MMOL/L (ref 136–145)
TSH SERPL DL<=0.005 MIU/L-ACNC: 0.89 UIU/ML (ref 0.27–4.2)
WBC # BLD AUTO: 5.8 K/UL (ref 4–11)

## 2024-09-28 LAB
HIV 1+2 AB+HIV1 P24 AG SERPL QL IA: NORMAL
HIV 2 AB SERPL QL IA: NORMAL
HIV1 AB SERPL QL IA: NORMAL
HIV1 P24 AG SERPL QL IA: NORMAL

## 2024-10-01 LAB
HCV AB S/CO SERPL IA: 9.29 IV
HCV AB SERPL QL IA: ABNORMAL

## 2024-11-09 ENCOUNTER — HOSPITAL ENCOUNTER (EMERGENCY)
Age: 34
Discharge: HOME | End: 2024-11-09
Attending: EMERGENCY MEDICINE
Payer: COMMERCIAL

## 2024-11-09 ENCOUNTER — APPOINTMENT (OUTPATIENT)
Dept: GENERAL RADIOLOGY | Age: 34
End: 2024-11-09
Payer: COMMERCIAL

## 2024-11-09 VITALS
OXYGEN SATURATION: 98 % | SYSTOLIC BLOOD PRESSURE: 142 MMHG | HEART RATE: 70 BPM | RESPIRATION RATE: 16 BRPM | TEMPERATURE: 97.9 F | WEIGHT: 285 LBS | HEIGHT: 75 IN | BODY MASS INDEX: 35.43 KG/M2 | DIASTOLIC BLOOD PRESSURE: 79 MMHG

## 2024-11-09 DIAGNOSIS — M54.6 ACUTE RIGHT-SIDED THORACIC BACK PAIN: Primary | ICD-10-CM

## 2024-11-09 LAB
ALBUMIN SERPL-MCNC: 3.9 G/DL (ref 3.4–5)
ALBUMIN/GLOB SERPL: 1.3 {RATIO} (ref 1.1–2.2)
ALP SERPL-CCNC: 95 U/L (ref 40–129)
ALT SERPL-CCNC: 23 U/L (ref 10–40)
ANION GAP SERPL CALCULATED.3IONS-SCNC: 10 MMOL/L (ref 3–16)
AST SERPL-CCNC: 23 U/L (ref 15–37)
BASOPHILS # BLD: 0.1 K/UL (ref 0–0.2)
BASOPHILS NFR BLD: 0.9 %
BILIRUB SERPL-MCNC: 0.3 MG/DL (ref 0–1)
BILIRUB UR QL STRIP.AUTO: NEGATIVE
BUN SERPL-MCNC: 11 MG/DL (ref 7–20)
CALCIUM SERPL-MCNC: 8.4 MG/DL (ref 8.3–10.6)
CHLORIDE SERPL-SCNC: 103 MMOL/L (ref 99–110)
CLARITY UR: CLEAR
CO2 SERPL-SCNC: 22 MMOL/L (ref 21–32)
COLOR UR: YELLOW
CREAT SERPL-MCNC: 0.9 MG/DL (ref 0.9–1.3)
DEPRECATED RDW RBC AUTO: 14.3 % (ref 12.4–15.4)
EOSINOPHIL # BLD: 0.2 K/UL (ref 0–0.6)
EOSINOPHIL NFR BLD: 3 %
FLUAV RNA RESP QL NAA+PROBE: NOT DETECTED
FLUBV RNA RESP QL NAA+PROBE: NOT DETECTED
GFR SERPLBLD CREATININE-BSD FMLA CKD-EPI: >90 ML/MIN/{1.73_M2}
GLUCOSE SERPL-MCNC: 95 MG/DL (ref 70–99)
GLUCOSE UR STRIP.AUTO-MCNC: NEGATIVE MG/DL
HCT VFR BLD AUTO: 43 % (ref 40.5–52.5)
HGB BLD-MCNC: 14.4 G/DL (ref 13.5–17.5)
HGB UR QL STRIP.AUTO: NEGATIVE
KETONES UR STRIP.AUTO-MCNC: NEGATIVE MG/DL
LEUKOCYTE ESTERASE UR QL STRIP.AUTO: NEGATIVE
LIPASE SERPL-CCNC: 18 U/L (ref 13–60)
LYMPHOCYTES # BLD: 2 K/UL (ref 1–5.1)
LYMPHOCYTES NFR BLD: 25.9 %
MCH RBC QN AUTO: 29.1 PG (ref 26–34)
MCHC RBC AUTO-ENTMCNC: 33.6 G/DL (ref 31–36)
MCV RBC AUTO: 86.6 FL (ref 80–100)
MONOCYTES # BLD: 0.6 K/UL (ref 0–1.3)
MONOCYTES NFR BLD: 7.5 %
NEUTROPHILS # BLD: 4.9 K/UL (ref 1.7–7.7)
NEUTROPHILS NFR BLD: 62.7 %
NITRITE UR QL STRIP.AUTO: NEGATIVE
PH UR STRIP.AUTO: 7.5 [PH] (ref 5–8)
PLATELET # BLD AUTO: 176 K/UL (ref 135–450)
PMV BLD AUTO: 7.4 FL (ref 5–10.5)
POTASSIUM SERPL-SCNC: 4 MMOL/L (ref 3.5–5.1)
PROT SERPL-MCNC: 6.8 G/DL (ref 6.4–8.2)
PROT UR STRIP.AUTO-MCNC: NEGATIVE MG/DL
RBC # BLD AUTO: 4.96 M/UL (ref 4.2–5.9)
SARS-COV-2 RNA RESP QL NAA+PROBE: NOT DETECTED
SODIUM SERPL-SCNC: 135 MMOL/L (ref 136–145)
SP GR UR STRIP.AUTO: 1.02 (ref 1–1.03)
UA COMPLETE W REFLEX CULTURE PNL UR: NORMAL
UA DIPSTICK W REFLEX MICRO PNL UR: NORMAL
URN SPEC COLLECT METH UR: NORMAL
UROBILINOGEN UR STRIP-ACNC: 0.2 E.U./DL
WBC # BLD AUTO: 7.8 K/UL (ref 4–11)

## 2024-11-09 PROCEDURE — 80053 COMPREHEN METABOLIC PANEL: CPT

## 2024-11-09 PROCEDURE — 81003 URINALYSIS AUTO W/O SCOPE: CPT

## 2024-11-09 PROCEDURE — 71046 X-RAY EXAM CHEST 2 VIEWS: CPT

## 2024-11-09 PROCEDURE — 99284 EMERGENCY DEPT VISIT MOD MDM: CPT

## 2024-11-09 PROCEDURE — 85025 COMPLETE CBC W/AUTO DIFF WBC: CPT

## 2024-11-09 PROCEDURE — 83690 ASSAY OF LIPASE: CPT

## 2024-11-09 PROCEDURE — 87636 SARSCOV2 & INF A&B AMP PRB: CPT

## 2024-11-09 ASSESSMENT — PAIN DESCRIPTION - ORIENTATION: ORIENTATION: RIGHT

## 2024-11-09 ASSESSMENT — PAIN DESCRIPTION - PAIN TYPE: TYPE: ACUTE PAIN

## 2024-11-09 ASSESSMENT — PAIN DESCRIPTION - FREQUENCY: FREQUENCY: INTERMITTENT

## 2024-11-09 ASSESSMENT — PAIN DESCRIPTION - LOCATION: LOCATION: BACK

## 2024-11-09 ASSESSMENT — PAIN DESCRIPTION - DESCRIPTORS: DESCRIPTORS: ACHING

## 2024-11-09 ASSESSMENT — PAIN - FUNCTIONAL ASSESSMENT: PAIN_FUNCTIONAL_ASSESSMENT: 0-10

## 2024-11-09 ASSESSMENT — PAIN SCALES - GENERAL: PAINLEVEL_OUTOF10: 5

## 2024-11-09 NOTE — DISCHARGE INSTRUCTIONS
Rest.  Apply heating pads to your area of discomfort.  Use OTC Tylenol and Ibuprofen to treat your symptoms / pain.    Follow up with your primary doctor for further evaluation.  Return to the ER with any worsening symptoms, or new concerns.

## 2024-11-09 NOTE — ED PROVIDER NOTES
Emergency Department Provider Note  Location: [facility] ED  11/9/2024     Patient Identification  Jose Castro is a 34 y.o. male    Chief Complaint  Back Pain (Right side back pain, patient states he woke up with it this morning, it has become worse about 45 minutes ago, he became pale when the pain became worse. )          HPI  (History provided by patient and spouse/SO)  Patient presents with right thoracic back pain.  Started about an hour ago and lasted for approximately 30 minutes, and now has resolved.  He has a history of low back pain, but this seems different.  He has had a nonproductive cough for the past 2 days.  Mild sinus congestion.  No fevers.  Denies chest pain, shortness of breath, abdominal pain, urinary changes.  No recent trauma or injury.    Nursing Notes reviewed.    Allergies:   Allergies   Allergen Reactions    Bactrim [Sulfamethoxazole-Trimethoprim]        Past medical history:  has a past medical history of IV drug abuse ([facility]) and Seizures ([facility]).    Past surgical history:  has a past surgical history that includes Cholecystectomy and [place] tooth extraction.    Home medications:   Prior to Admission medications    Medication Sig Start Date End Date Taking? Authorizing Provider   albuterol sulfate HFA (PROVENTIL HFA) 108 (90 Base) MCG/ACT inhaler Inhale 2 puffs into the lungs every 6 hours as needed for Wheezing or Shortness of Breath 1/5/24  Yes Laura uLke PA-C   azithromycin (ZITHROMAX) 250 MG tablet 2 po day 1, then 1 po days 2-5  Patient not taking: Reported on 11/9/2024 1/5/24   Laura Luke PA-C       Social history:  reports that he quit smoking about 4 years ago. His smoking use included cigarettes and e-cigarettes. He has never used smokeless tobacco. He reports current alcohol use. He reports current drug use. Drugs: Marijuana (Weed), Heroin, Fentanyl, Cocaine, and Methamphetamines (Crystal Meth).      Physical Exam  ED Triage Vitals [11/09/24  12:25 am COMPARISON: None. HISTORY: ORDERING SYSTEM PROVIDED HISTORY: Chest Pain TECHNOLOGIST PROVIDED HISTORY: Reason for exam:->Chest Pain Reason for Exam: chest and back pain, pa rt side FINDINGS: The mediastinum is unremarkable. The cardiac silhouette is normal size. The lungs are clear.     There is no evidence of acute chest disease.      Labs  Results for orders placed or performed during the hospital encounter of 11/09/24   COVID-19 & Influenza Combo    Specimen: Nasopharyngeal Swab   Result Value Ref Range    SARS-CoV-2 RNA, RT PCR NOT DETECTED NOT DETECTED    Influenza A NOT DETECTED NOT DETECTED    Influenza B NOT DETECTED NOT DETECTED   Urinalysis with Reflex to Culture    Specimen: Urine   Result Value Ref Range    Color, UA Yellow Straw/Yellow    Clarity, UA Clear Clear    Glucose, Ur Negative Negative mg/dL    Bilirubin, Urine Negative Negative    Ketones, Urine Negative Negative mg/dL    Specific Gravity, UA 1.020 1.005 - 1.030    Blood, Urine Negative Negative    pH, Urine 7.5 5.0 - 8.0    Protein, UA Negative Negative mg/dL    Urobilinogen, Urine 0.2 <2.0 E.U./dL    Nitrite, Urine Negative Negative    Leukocyte Esterase, Urine Negative Negative    Microscopic Examination Not Indicated     Urine Type NotGiven     Urine Reflex to Culture Not Indicated    CBC with Auto Differential   Result Value Ref Range    WBC 7.8 4.0 - 11.0 K/uL    RBC 4.96 4.20 - 5.90 M/uL    Hemoglobin 14.4 13.5 - 17.5 g/dL    Hematocrit 43.0 40.5 - 52.5 %    MCV 86.6 80.0 - 100.0 fL    MCH 29.1 26.0 - 34.0 pg    MCHC 33.6 31.0 - 36.0 g/dL    RDW 14.3 12.4 - 15.4 %    Platelets 176 135 - 450 K/uL    MPV 7.4 5.0 - 10.5 fL    Neutrophils % 62.7 %    Lymphocytes % 25.9 %    Monocytes % 7.5 %    Eosinophils % 3.0 %    Basophils % 0.9 %    Neutrophils Absolute 4.9 1.7 - 7.7 K/uL    Lymphocytes Absolute 2.0 1.0 - 5.1 K/uL    Monocytes Absolute 0.6 0.0 - 1.3 K/uL    Eosinophils Absolute 0.2 0.0 - 0.6 K/uL    Basophils Absolute 0.1 0.0

## 2024-11-09 NOTE — ED TRIAGE NOTES
Right side back pain, patient states he woke up with it this morning, it has become worse about 45 minutes ago, he became pale when the pain became worse.

## 2024-12-19 ENCOUNTER — HOSPITAL ENCOUNTER (EMERGENCY)
Age: 34
Discharge: HOME | End: 2024-12-19
Payer: COMMERCIAL

## 2024-12-19 VITALS
DIASTOLIC BLOOD PRESSURE: 97 MMHG | HEART RATE: 77 BPM | TEMPERATURE: 97.5 F | OXYGEN SATURATION: 99 % | SYSTOLIC BLOOD PRESSURE: 150 MMHG | RESPIRATION RATE: 18 BRPM

## 2024-12-19 DIAGNOSIS — G58.9 MONONEUROPATHY: ICD-10-CM

## 2024-12-19 DIAGNOSIS — M54.31 SCIATICA OF RIGHT SIDE: Primary | ICD-10-CM

## 2024-12-19 DIAGNOSIS — S99.921A TOE INJURY, RIGHT, INITIAL ENCOUNTER: ICD-10-CM

## 2024-12-19 LAB
BILIRUB UR QL STRIP.AUTO: NEGATIVE
CLARITY UR: CLEAR
COLOR UR: YELLOW
GLUCOSE UR STRIP.AUTO-MCNC: NEGATIVE MG/DL
HGB UR QL STRIP.AUTO: NEGATIVE
KETONES UR STRIP.AUTO-MCNC: NEGATIVE MG/DL
LEUKOCYTE ESTERASE UR QL STRIP.AUTO: NEGATIVE
NITRITE UR QL STRIP.AUTO: NEGATIVE
PH UR STRIP.AUTO: 6 [PH] (ref 5–8)
PROT UR STRIP.AUTO-MCNC: NEGATIVE MG/DL
SP GR UR STRIP.AUTO: >=1.03 (ref 1–1.03)
UA COMPLETE W REFLEX CULTURE PNL UR: NORMAL
UA DIPSTICK W REFLEX MICRO PNL UR: NORMAL
URN SPEC COLLECT METH UR: NORMAL
UROBILINOGEN UR STRIP-ACNC: 0.2 E.U./DL

## 2024-12-19 PROCEDURE — 81003 URINALYSIS AUTO W/O SCOPE: CPT

## 2024-12-19 PROCEDURE — 99283 EMERGENCY DEPT VISIT LOW MDM: CPT

## 2024-12-19 RX ORDER — PREDNISONE 20 MG/1
TABLET ORAL
Qty: 20 TABLET | Refills: 0 | Status: SHIPPED | OUTPATIENT
Start: 2024-12-19 | End: 2024-12-31

## 2024-12-19 ASSESSMENT — PAIN - FUNCTIONAL ASSESSMENT: PAIN_FUNCTIONAL_ASSESSMENT: 0-10

## 2024-12-19 ASSESSMENT — PAIN SCALES - GENERAL: PAINLEVEL_OUTOF10: 8

## 2024-12-20 NOTE — ED PROVIDER NOTES
is patient has a blackened area on the tip of the right great toe that represented a \"bruise\" on a rock from several weeks ago.  Area does not look infected at this time and there is no erythema, swelling, lymphangitis, or drainage; however, I cannot rule out necrotic tissue as well.  Plan was to x-ray the foot to make sure there is no osteomyelitis or other concerning finding; however, patient refused this.  Risks and benefits of having x-ray versus refusal were discussed at length with the patient.  With full knowledge of the risks and benefits of refusing imaging, patient refused to have x-ray performed of his foot.  Initial concern by staff for urinary symptoms as patient reported urinary frequency.  Patient had urinalysis performed.  This did not show a UTI.  No costovertebral or flank tenderness.  No incontinence or constipation or saddle paresthesias.  Low suspicion for cauda equina.  Low suspicion for psoas abscess, epidural abscess, or spinal abscess based on history and exam; however, with the history of IV drug abuse, this cannot be absolutely ruled out.  Patient declined further more in-depth imaging.  Management of his acute on chronic sciatica was discussed at length with the patient.  Patient advised that usually he was given a steroid taper, which helped his condition.  I will provide patient with a prescription for a prednisone taper.  He declined muscle relaxers.  He also declined Toradol in the ED.  Need for and importance of prompt follow-up was discussed at length with the patient and his significant other.  Patient was advised of consequences of failure to follow-up.  Patient stated that he would call his primary care doctor.  In shared decision making, patient will be discharged with emphasis on outpatient follow-up.  Guidelines prompting return were provided.  Expectant guidance was provided.    Disposition Considerations (tests considered but not done, Admit vs D/C, Shared Decision Making,

## 2024-12-20 NOTE — DISCHARGE INSTRUCTIONS
Follow-up with family doctor or listed primary care group.  Call first thing in the morning to set up follow-up appointment.  Your foot needs to be looked at very closely.  You need to have the injury to your toe evaluated and your family doctor needs to monitor your foot and leg closely.  Apply moist heat to your hip and lower back for 20 minutes every hour to help with pain.  Stretch and do the exercises from physical therapy as we discussed.  Do this twice per day.

## 2025-03-19 ENCOUNTER — HOSPITAL ENCOUNTER (EMERGENCY)
Age: 35
Discharge: HOME | End: 2025-03-19
Attending: EMERGENCY MEDICINE
Payer: COMMERCIAL

## 2025-03-19 VITALS
HEART RATE: 88 BPM | BODY MASS INDEX: 35.94 KG/M2 | SYSTOLIC BLOOD PRESSURE: 126 MMHG | OXYGEN SATURATION: 95 % | DIASTOLIC BLOOD PRESSURE: 81 MMHG | WEIGHT: 280 LBS | TEMPERATURE: 97.8 F | HEIGHT: 74 IN | RESPIRATION RATE: 18 BRPM

## 2025-03-19 DIAGNOSIS — R11.2 NAUSEA AND VOMITING, UNSPECIFIED VOMITING TYPE: ICD-10-CM

## 2025-03-19 DIAGNOSIS — J06.9 VIRAL URI: Primary | ICD-10-CM

## 2025-03-19 PROCEDURE — 99283 EMERGENCY DEPT VISIT LOW MDM: CPT

## 2025-03-19 PROCEDURE — 87636 SARSCOV2 & INF A&B AMP PRB: CPT

## 2025-03-19 PROCEDURE — 6370000000 HC RX 637 (ALT 250 FOR IP): Performed by: EMERGENCY MEDICINE

## 2025-03-19 RX ORDER — GUAIFENESIN 600 MG/1
600 TABLET, EXTENDED RELEASE ORAL 2 TIMES DAILY
Qty: 30 TABLET | Refills: 0 | Status: SHIPPED | OUTPATIENT
Start: 2025-03-19 | End: 2025-04-03

## 2025-03-19 RX ORDER — ONDANSETRON 4 MG/1
4 TABLET, ORALLY DISINTEGRATING ORAL ONCE
Status: COMPLETED | OUTPATIENT
Start: 2025-03-19 | End: 2025-03-19

## 2025-03-19 RX ORDER — ONDANSETRON 4 MG/1
4 TABLET, FILM COATED ORAL 3 TIMES DAILY PRN
Qty: 15 TABLET | Refills: 0 | Status: SHIPPED | OUTPATIENT
Start: 2025-03-19

## 2025-03-19 RX ADMIN — ONDANSETRON 4 MG: 4 TABLET, ORALLY DISINTEGRATING ORAL at 07:49

## 2025-03-19 ASSESSMENT — PAIN - FUNCTIONAL ASSESSMENT: PAIN_FUNCTIONAL_ASSESSMENT: NONE - DENIES PAIN

## 2025-03-19 NOTE — ED PROVIDER NOTES
4 MG TABLET    Take 1 tablet by mouth 3 times daily as needed for Nausea or Vomiting      DISCONTINUED MEDICATIONS:   Discontinued Medications    No medications on file            (Please note that portions of this note were completed with a voice recognition program.  Efforts were made to edit the dictations but occasionally words are mis-transcribed.)     Jayme Gaspar II, DO (electronically signed)        Jayme Gaspar II, DO  03/19/25 5077

## 2025-05-02 ENCOUNTER — HOSPITAL ENCOUNTER (EMERGENCY)
Age: 35
Discharge: HOME | End: 2025-05-02
Attending: EMERGENCY MEDICINE
Payer: COMMERCIAL

## 2025-05-02 VITALS
OXYGEN SATURATION: 97 % | BODY MASS INDEX: 35.94 KG/M2 | RESPIRATION RATE: 16 BRPM | HEIGHT: 74 IN | DIASTOLIC BLOOD PRESSURE: 77 MMHG | HEART RATE: 86 BPM | SYSTOLIC BLOOD PRESSURE: 129 MMHG | TEMPERATURE: 98.4 F | WEIGHT: 280 LBS

## 2025-05-02 DIAGNOSIS — K08.89 PAIN, DENTAL: Primary | ICD-10-CM

## 2025-05-02 PROCEDURE — 99283 EMERGENCY DEPT VISIT LOW MDM: CPT

## 2025-05-02 RX ORDER — GABAPENTIN 100 MG/1
100 CAPSULE ORAL 2 TIMES DAILY
Qty: 20 CAPSULE | Refills: 1 | Status: SHIPPED | OUTPATIENT
Start: 2025-05-02 | End: 2025-10-29

## 2025-05-02 RX ORDER — GABAPENTIN 100 MG/1
200 CAPSULE ORAL ONCE
Status: DISCONTINUED | OUTPATIENT
Start: 2025-05-02 | End: 2025-05-02 | Stop reason: HOSPADM

## 2025-05-02 ASSESSMENT — PAIN DESCRIPTION - LOCATION: LOCATION: MOUTH

## 2025-05-02 ASSESSMENT — PAIN - FUNCTIONAL ASSESSMENT: PAIN_FUNCTIONAL_ASSESSMENT: 0-10

## 2025-05-02 ASSESSMENT — PAIN SCALES - GENERAL: PAINLEVEL_OUTOF10: 0

## 2025-05-02 NOTE — ED PROVIDER NOTES
EMERGENCY DEPARTMENT ENCOUNTER     [facility] EMERGENCY DEPARTMENT     Pt Name: Jose Castro   MRN: [id number]   Birthdate 1990   Date of evaluation: 5/2/2025   Provider: Fredo Shipman MD   PCP: No primary care provider on file.   Note Started: 1:58 AM EDT 5/2/25     CHIEF COMPLAINT     Chief Complaint   Patient presents with    Dental Pain     Pt had sx to get top dentures placed on 4/22. Pt took out dentures tonight and noticed a painful and noticed part of his gum was swollen and red. Pt says it's painful.         HISTORY OF PRESENT ILLNESS:  History from : Patient        Jose Castro is a 35 y.o. male who presents for evaluation of dental pain.  Patient reports having dental surgery on 4/23/2025.  He states that he had multiple teeth removed.  He reports that he has area to the right anterior portion of the maxilla where there is what appears to be retained tooth or bone fragment.  He states that he is having pain around the area.  States it was bleeding earlier but currently bleeding is controlled.  He denies facial swelling or difficulty swallowing or breathing.  He denies fevers.  Patient was prescribed antibiotics after surgery and states he has not taken them.  He has not followed up with his oral surgeon.     Nursing Notes were all reviewed and agreed with or any disagreements were addressed in the HPI.     ROS: Positives and Pertinent negatives as per HPI.    PAST MEDICAL HISTORY     Past medical history:  has a past medical history of IV drug abuse (HCC) and Seizures (HCC).    Past surgical history:  has a past surgical history that includes Cholecystectomy and [place] tooth extraction.    Med list:   No current facility-administered medications on file prior to encounter.     Current Outpatient Medications on File Prior to Encounter   Medication Sig Dispense Refill    ondansetron (ZOFRAN) 4 MG tablet Take 1 tablet by mouth 3 times daily as needed for Nausea or Vomiting 15  tablet 0       PHYSICAL EXAM:  ED Triage Vitals [05/02/25 0144]   BP Systolic BP Percentile Diastolic BP Percentile Temp Temp Source Pulse Respirations SpO2   129/77 -- -- 98.4 °F (36.9 °C) Oral 86 16 97 %      Height Weight - Scale         1.88 m (6' 2\") 127 kg (280 lb)              Physical Exam  Vitals reviewed.   Constitutional:       General: He is not in acute distress.     Appearance: He is well-developed.   HENT:      Head: Normocephalic and atraumatic.      Mouth/Throat:      Mouth: Mucous membranes are moist.      Pharynx: Oropharynx is clear.   Eyes:      Conjunctiva/sclera: Conjunctivae normal.   Neck:      Trachea: No tracheal deviation.   Cardiovascular:      Rate and Rhythm: Normal rate and regular rhythm.   Pulmonary:      Effort: Pulmonary effort is normal.      Breath sounds: Normal breath sounds. No wheezing or rales.   Abdominal:      General: There is no distension.      Palpations: Abdomen is soft.      Tenderness: There is no abdominal tenderness.   Musculoskeletal:         General: No deformity. Normal range of motion.      Cervical back: Normal range of motion.   Skin:     General: Skin is warm and dry.   Neurological:      Mental Status: He is alert and oriented to person, place, and time.          DIAGNOSTIC RESULTS   LABS:   Labs Reviewed - No data to display   When ordered only abnormal lab results are displayed. All other labs were within normal range or not returned as of this dictation.     RADIOLOGY:    Non-plain film images such as CT, Ultrasound and MRI are read by the radiologist. Plain radiographic images are visualized and preliminarily interpreted by the ED Provider with the below findings:      Interpretation per the Radiologist below, if available at the time of this note:    No orders to display             EMERGENCY DEPARTMENT COURSE and DIFFERENTIAL DIAGNOSIS/MDM:   Differential Diagnosis: Dental Abscess, Airway Obstruction, Omar's Angina,